# Patient Record
Sex: FEMALE | Race: WHITE | NOT HISPANIC OR LATINO | Employment: OTHER | URBAN - METROPOLITAN AREA
[De-identification: names, ages, dates, MRNs, and addresses within clinical notes are randomized per-mention and may not be internally consistent; named-entity substitution may affect disease eponyms.]

---

## 2017-02-03 ENCOUNTER — HISTORICAL (OUTPATIENT)
Dept: RADIOLOGY | Facility: HOSPITAL | Age: 72
End: 2017-02-03

## 2017-02-10 ENCOUNTER — HISTORICAL (OUTPATIENT)
Dept: RADIOLOGY | Facility: HOSPITAL | Age: 72
End: 2017-02-10

## 2017-02-15 ENCOUNTER — HISTORICAL (OUTPATIENT)
Dept: RADIOLOGY | Facility: HOSPITAL | Age: 72
End: 2017-02-15

## 2017-02-15 ENCOUNTER — HISTORICAL (OUTPATIENT)
Dept: SURGERY | Facility: HOSPITAL | Age: 72
End: 2017-02-15

## 2017-02-16 ENCOUNTER — HISTORICAL (OUTPATIENT)
Dept: ANESTHESIOLOGY | Facility: HOSPITAL | Age: 72
End: 2017-02-16

## 2017-03-06 ENCOUNTER — HISTORICAL (OUTPATIENT)
Dept: RADIOLOGY | Facility: HOSPITAL | Age: 72
End: 2017-03-06

## 2017-04-12 ENCOUNTER — HISTORICAL (OUTPATIENT)
Dept: RADIOLOGY | Facility: HOSPITAL | Age: 72
End: 2017-04-12

## 2017-06-26 ENCOUNTER — HISTORICAL (OUTPATIENT)
Dept: RADIOLOGY | Facility: HOSPITAL | Age: 72
End: 2017-06-26

## 2018-05-01 ENCOUNTER — HISTORICAL (OUTPATIENT)
Dept: RADIOLOGY | Facility: HOSPITAL | Age: 73
End: 2018-05-01

## 2018-05-08 ENCOUNTER — HISTORICAL (OUTPATIENT)
Dept: SURGERY | Facility: HOSPITAL | Age: 73
End: 2018-05-08

## 2018-05-08 LAB
ABS NEUT (OLG): 7 X10(3)/MCL (ref 1.5–6.9)
ALBUMIN SERPL-MCNC: 3.9 GM/DL (ref 3.4–5)
ALBUMIN/GLOB SERPL: 0.9 RATIO
ALP SERPL-CCNC: 96 UNIT/L (ref 30–113)
ALT SERPL-CCNC: 22 UNIT/L (ref 10–45)
APTT PPP: 28.1 SECOND(S) (ref 25–35)
AST SERPL-CCNC: 19 UNIT/L (ref 15–37)
BILIRUB SERPL-MCNC: 0.4 MG/DL (ref 0.1–0.9)
BILIRUBIN DIRECT+TOT PNL SERPL-MCNC: 0.1 MG/DL (ref 0–0.3)
BILIRUBIN DIRECT+TOT PNL SERPL-MCNC: 0.3 MG/DL
BUN SERPL-MCNC: 9 MG/DL (ref 10–20)
CALCIUM SERPL-MCNC: 9.4 MG/DL (ref 8–10.5)
CHLORIDE SERPL-SCNC: 100 MMOL/L (ref 100–108)
CO2 SERPL-SCNC: 28 MMOL/L (ref 21–35)
CREAT SERPL-MCNC: 0.47 MG/DL (ref 0.7–1.3)
ERYTHROCYTE [DISTWIDTH] IN BLOOD BY AUTOMATED COUNT: 13 % (ref 11.5–17)
GLOBULIN SER-MCNC: 4.4 GM/DL
GLUCOSE SERPL-MCNC: 96 MG/DL (ref 75–116)
HCT VFR BLD AUTO: 41.4 % (ref 36–48)
HGB BLD-MCNC: 13.6 GM/DL (ref 12–16)
INR PPP: 1 (ref 0–1.2)
MCH RBC QN AUTO: 29 PG (ref 27–34)
MCHC RBC AUTO-ENTMCNC: 33 GM/DL (ref 31–36)
MCV RBC AUTO: 89 FL (ref 80–99)
PLATELET # BLD AUTO: 272 X10(3)/MCL (ref 140–400)
PMV BLD AUTO: 9.7 FL (ref 6.8–10)
POTASSIUM SERPL-SCNC: 3.3 MMOL/L (ref 3.6–5.2)
PROT SERPL-MCNC: 8.3 GM/DL (ref 6.4–8.2)
PROTHROMBIN TIME: 10 SECOND(S) (ref 9–12)
RBC # BLD AUTO: 4.63 X10(6)/MCL (ref 4.2–5.4)
SODIUM SERPL-SCNC: 139 MMOL/L (ref 135–145)
TSH SERPL-ACNC: 0.67 MIU/ML (ref 0.36–3.74)
WBC # SPEC AUTO: 9.2 X10(3)/MCL (ref 4.5–11.5)

## 2018-05-10 ENCOUNTER — HISTORICAL (OUTPATIENT)
Dept: ANESTHESIOLOGY | Facility: HOSPITAL | Age: 73
End: 2018-05-10

## 2018-07-23 ENCOUNTER — HISTORICAL (OUTPATIENT)
Dept: RADIOLOGY | Facility: HOSPITAL | Age: 73
End: 2018-07-23

## 2018-10-16 ENCOUNTER — HISTORICAL (OUTPATIENT)
Dept: RADIOLOGY | Facility: HOSPITAL | Age: 73
End: 2018-10-16

## 2018-12-06 ENCOUNTER — HISTORICAL (OUTPATIENT)
Dept: ANESTHESIOLOGY | Facility: HOSPITAL | Age: 73
End: 2018-12-06

## 2019-03-01 ENCOUNTER — HISTORICAL (OUTPATIENT)
Dept: RADIOLOGY | Facility: HOSPITAL | Age: 74
End: 2019-03-01

## 2019-04-23 ENCOUNTER — HISTORICAL (OUTPATIENT)
Dept: RADIOLOGY | Facility: HOSPITAL | Age: 74
End: 2019-04-23

## 2019-05-14 ENCOUNTER — HISTORICAL (OUTPATIENT)
Dept: RADIOLOGY | Facility: HOSPITAL | Age: 74
End: 2019-05-14

## 2020-04-28 ENCOUNTER — HISTORICAL (OUTPATIENT)
Dept: ADMINISTRATIVE | Facility: HOSPITAL | Age: 75
End: 2020-04-28

## 2020-04-30 ENCOUNTER — HISTORICAL (OUTPATIENT)
Dept: ADMINISTRATIVE | Facility: HOSPITAL | Age: 75
End: 2020-04-30

## 2020-05-04 ENCOUNTER — HISTORICAL (OUTPATIENT)
Dept: ADMINISTRATIVE | Facility: HOSPITAL | Age: 75
End: 2020-05-04

## 2020-05-05 ENCOUNTER — HISTORICAL (OUTPATIENT)
Dept: ADMINISTRATIVE | Facility: HOSPITAL | Age: 75
End: 2020-05-05

## 2021-01-26 ENCOUNTER — HISTORICAL (OUTPATIENT)
Dept: RADIOLOGY | Facility: HOSPITAL | Age: 76
End: 2021-01-26

## 2021-02-02 ENCOUNTER — HISTORICAL (OUTPATIENT)
Dept: RADIOLOGY | Facility: HOSPITAL | Age: 76
End: 2021-02-02

## 2021-03-29 ENCOUNTER — HOSPITAL ENCOUNTER (OUTPATIENT)
Dept: MEDSURG UNIT | Facility: HOSPITAL | Age: 76
End: 2021-03-30
Attending: OBSTETRICS & GYNECOLOGY | Admitting: OBSTETRICS & GYNECOLOGY

## 2022-04-28 NOTE — OP NOTE
PREOPERATIVE DIAGNOSIS:  Weight loss.    POSTOPERATIVE DIAGNOSIS:  Grade A hiatal hernia, moderate to severe gastritis with petechial ooze.  Status post Helicobacter pylori biopsy of the gastric antrum.    INDICATION:  A 73-year-old white female with a history of approximately 20-pound weight loss over the last 2 years.  She is a smoker.  She has never had any endoscopy before.  We are performing this in order to assess her risk factors for aerodigestive tumors and infections.    DETAILS OF PROCEDURE:  She was consented for the procedure in my office.  The risks and benefits of the procedure were explained to her in detail.  She was willing to undergo those risks.     Please see Anesthesia for their consents as well as their administration of medications.     The patient was brought down to the endoscopy suite, laid in the left lateral decubitus position.  Viscous lidocaine was then swished and swallowed.  A bite block was placed.  The patient was turned to the left lateral decubitus position.  A gastroscope was then lubricated and inserted into the posterior oropharynx.     I took a close look at the posterior oropharynx.  There were no obvious abnormalities.  There were no masses or tumors in the proximal esophagus.  Vocal cords were visualized.  Tracheal and paratracheal regions and laryngeal regions were free of any obvious disease.     The scope was then used to intubate the piriform recess and down the esophagus.  There were no signs of obstruction or masses in the esophageal column.     The scope was then used to insufflate the stomach.  The stomach showed just upon entering some aspects of petechial hemorrhage in certain areas.  She had fair peristalsis in the stomach and she had gastritis that was moderate to severe throughout.     The pylorus was then intubated.  The duodenum and jejunum were within normal limits.  The sphincter of Oddi normal.  No mass lesions, polyps or new mucosal changes.      The scope was pulled back.  The gastric antrum was then biopsied for H pylori, sent off in jar #1.  The greater and lesser curvatures were free of any masses, polyps or any mucosal changes, except gastritis.  Same applied to the angularis.     The scope was turned in retroflexion.  There were no masses, polyps or any signs of overt malignancy to the cardiac and fundic regions.  From an inferior perspective to the GE junction, I was able to see with the scope in retroflexion.  She had a grade A hiatal hernia.     The scope was taken out of retroflexion.  The GE junction was evaluated.  The Z-line was intact and smooth and linear.  It did not show any signs of other gross pathology.     I took a close look at the esophageal column.  There were no masses or polyps.  She had good peristalsis.  No Schatzki rings or obstructions.     The scope was then evaluated for the proximal 2/3 to the same degree with no pathology.     The scope was removed.  Patient tolerated the procedure well.    SUMMARY:  In summary, a 73-year-old white female with grade A hiatal hernia, moderate to severe gastritis, status post Helicobacter pylori gastric antrum biopsy.     Will follow up on these biopsies.  I think her weight loss is due to, at least from the GI perspective, poor diet and long-term gastritis (her colonoscopy was normal, heretofore dictated).     Will follow up on the biopsies and make recommendations thereafter.  For right now, will ensure she is on PPIs and I will instruct her on the proper diet.     Thank you for allowing me to participate in the care of your patient.        LONNIE   DD: 12/06/2018 0754   DT: 12/06/2018 0918  Job # [id number]    cc: Jenaro __________

## 2022-04-28 NOTE — OP NOTE
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    Postoperative diagnosis: Same    Procedure: Excision of benign lesion from the pinna of the left ear (1.5 cm) with simple wound closure (3.5 cm).    Surgeon: Garifeld Eagle M.D.    Findings: Patient had an ulcerative lesion involving the helix and a portion of the antihelix of the pinna of the left ear in its midportion.  The lesion was 1.5 cm in diameter with margins.  Frozen section analysis showed the lesion to be benign.  There was no evidence of malignancy.  Margins of resection were free of disease.  The pathologist indicated that this appeared to be an inflammatory lesion.  Final diagnosis cannot be made on the basis of the frozen section at this time.    Specimens: Skin lesion from the pinna of the left ear.    Estimated blood loss: Less than 5 mL.    Complications: None.    Drains: None.    Anesthesia: Local anesthesia with 2% Xylocaine with 1-100,000 epinephrine with sedation.    Indications: Please see history and physical exam    Procedure: The patient was brought to the operating room and placed on the operating room table in supine position after which she was sedated.  Examination of the pinna of the left ear showed the above-mentioned findings.  The lateral aspect of the pinna around the lesion was infiltrated with 2% Xylocaine with 1 100,000 epinephrine.  The patient was then prepped and draped in sterile fashion.  A circumferential incision was made around the lesion incorporating margin of normal-appearing skin.  The dimensions of the resection were 1.5 x 1.5 cm.  Incision was carried down through the subcutaneous tissue and  then the underlying cartilage was also resected.  The deep margin of the resection was the perichondrium on the posterior aspect of the cartilage.  The lesion was then undermined using blunt and sharp dissection with scissors.  Was then removed from the wound and tagged with a short suture on the superior margin and a long suture on the lateral margin.  Hemostasis was obtained with cautery.  The lesion was then sent for frozen section analysis.  The pathology report indicated that the lesion was not malignant and appeared to be inflammatory.  The margins of resection were free of disease.  Definitive diagnosis could not be made on the frozen section at this time.  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  After examination of the wound was decided to close the wound in a linear fashion.  A triangle of skin along with underlying cartilage was removed inferiorly and the small triangle of skin was also removed superiorly.  This allowed the wound to be converted to a linear wound with a vertical orientation to help re-create a helical rim.  This resulted in the formation of an incision 3.5 cm in length.  The wound was then closed in a single layer using a running 5-0 nylon horizontal mattress suture.  Bacitracin ointment was applied to the wound.  The procedure was then terminated.  The patient was then brought back to her room in stable condition.  She tolerated the procedure well.    CC: Dr. Jenaro Mix in [place]

## 2022-04-28 NOTE — OP NOTE
PREOPERATIVE DIAGNOSIS:  History of colon polyps.    POSTOPERATIVE DIAGNOSIS:  Tortuous colon, left-sided predominant diverticular disease, otherwise normal.    INDICATION:  A 73-year-old white female with a 20-pound weight loss over 2 years.  Her EGD was unrevealing and she had no overt bowel complaints besides occasional constipation.    DETAILS OF PROCEDURE:  She was consented for the procedure in my office prior to.  The risks and benefits of the procedure were explained to her in detail.  She was willing to undergo those risks.     Please see Anesthesia for their documents for consent as well as medication administration.     The patient was brought down to endoscopy room suite.  After the EGD was turned 180 degrees.  Rectal exam was performed.  Poor rectal tone.  No external abnormalities that were noteworthy with regard to the GI tract.  She did have a small lesion that had a keratinaceous horn over her right buttock, over the ischial area and otherwise that can be handled as an outpatient.     The Olympus colonoscope was then lubricated and advanced into the rectum.  It was arduously advanced to the right side of the colon.  She had diverticular disease on the left side that was difficult to get past.  I tried a couple of attempts.  Then I had to turn her in a supine position and I was able to get to the right side of the colon.  I was clearly at that point able to see the cecum.  I saw the appendiceal orifice and photographed.  However, due to her tortuosity could not intubate the terminal ileum.  There were no mass lesions or any polyps or mucosal changes to the right side of the colon, including the cecum.  360-degree circumferential views were then taken.  The hepatic flexure, transverse colon and splenic flexure were within normal limits.  There were a few scant diverticula, 1 to 2 in the transverse.  She had predominantly left-sided diverticular disease in the distal descending and sigmoid.   This was her area of tortuosity.  In this area, I saw no masses or polyps.  The same applied to the distal sigmoid and the rectum.  Of note, she had poor rectal tone and a short rectal column.  I could not therefore retroflex so I did a slow pullback and photographed and did not see any rectal masses or polyps.  The scope was removed.  Patient tolerated the procedure well.    SUMMARY:  In summary, a 73-year-old white female with scant diverticular disease, predominantly left sided and a tortuous colon.    RECOMMENDATIONS:  To repeat colonoscopy in 5 years, assuming not clinically indicated sooner.  With regard to her weight loss, I think overall from a GI perspective, it is likely an upper GI issue.  Would recommend further routine cancer screenings to assess also if the weight loss continues.     Thank you for allowing me to participate in the care of your patient.        LONNIE   DD: 12/06/2018 0758   DT: 12/06/2018 0909  Job # [id number]    cc: Jenaro __________

## 2022-04-28 NOTE — OP NOTE
Patient:   Lejeune, Betty Crochet            MRN: [id number]            FIN: [id number]               Age:   76 years     Sex:  Female     :  1945   Associated Diagnoses:   None   Author:   Hermelinda Turpin MD      Operative Note   Operative Information   Date/ Time:  3/29/2021 08:38:00.     Procedures Performed: Operative laparoscopy with lysis of adhesions ( Dr. Winnie Ortiz)  Left salpingo-oophorectomy (Dr. Hermelinda Turpin ).     Indications: 76-year-old female with postmenopausal adnexal mass and pelvic pain for definitive therapy.     Preoperative Diagnosis: Postmenopausal adnexal mass.     Postoperative Diagnosis: Same.     Surgeon: Hermelinda Turpin MD.     Assistant: Dr. Winnie Ortiz (co-surgeon).     Anesthesia: General endotracheal anesthesia.     Speciman Removed: Left tube and ovary  Frozen section reveals serous cystadenoma; ovarian fibroma; .     Description of Procedure/Findings/    Complications: Patient was taken to the operating room where general endotracheal anesthesia was obtained without difficulty she was prepped and draped in a sterile fashion dorsal lithotomy position and indwelling catheter was placed attention was turned to the patient's abdomen where decision was made to start with incision in the left upper quadrant I varies needle was introduced and sensation to obtain pneumoperitoneum the Veress was removed and another 5 mm trocar and sleeve was introduced trochars removed the camera was placed currently on replacement CO2 significant reconnected after reviewing the abdomen and pelvis with the findings as previously stated decision was made to proceed with port placement with a 5 trocar and sleeve in the left and right hypogastric region respectively under direct visualization at this time Dr. Sarah Penaloza As a Co-Surgeon Freed up the Adnexal Mass from the Left Sidewall Using the Vessel Sealer and the Unipolar Cautery to Mobilize the Adnexal Mass for Removal.   During the Lysis of Adhesions Adhesions There Is Noted to Be a Murky-type Fluid Coming from the Adnexa and Minimal Spillage Was Noted at This Time the Suction  Was Then Placed into the Abdomen and to the Ovarian Cyst Which Was Completely Evacuated of Fluid.  At This Time I Took over and Use the Vessel Sealer to Take down the Infundibulopelvic Ligament to Clamped Cut and Ligated.  The Specimen Was Then Brought out through a 4th Site Was Made in the Superpubic Region with a #1012 Trocar and Sleeve the Back Was Placed and the Adnexal Structures and Brought out through This Port Frozen Pathology Refrozen Findings Were As Previously Stated Benign the Pelvis Was Then Copiously Irrigated and the CO2 Was Allowed to Escape the Abdomen and the Pedicle Was Inspected and Noted to Be Hemostatic the CO2 Is an Reconnected the Fascial Port Incision Was Then Closed with 0 Vicryl by Dr. Titus Alex Using a Grainy Needle.  At This Time the Remainder of the CO2 Was Released from the Abdomen and the Trochars Were Removed and the Fascial Incisions of the Other 3 Ports Were Closed with 0 Vicryl and the Skin Was Closed Septic or Fashion with 4-0 Dexon the Verdugo Catheter Was Removed the Patient Was Awakened and Taken to Recovery in Stable Condition She Will Remain in the Hospital for Overnight Observation since She Is a Smoker and O2 Dependent an Elderly so She'll Be Observed Overnight.  All Surgical Findings Were Discussed with Patient's Family Including the Frozen Section Diagnosis All Surgical Counts Were Correct ×2.     Esimated blood loss: loss less than  100  cc.     Findings: Approximately 14 cm smooth-walled adnexal mass coming off of the left adnexal region; normal-appearing appendix and pelvis; ureteral peristalsis noted bilaterally  pre-and postoperatively normal-appearing smooth liver age and diaphragmatic surface.     Complications: None.

## 2022-05-04 ENCOUNTER — HOSPITAL ENCOUNTER (EMERGENCY)
Facility: HOSPITAL | Age: 77
Discharge: HOME | End: 2022-05-04
Attending: INTERNAL MEDICINE
Payer: MEDICARE

## 2022-05-04 VITALS
HEART RATE: 89 BPM | SYSTOLIC BLOOD PRESSURE: 142 MMHG | RESPIRATION RATE: 18 BRPM | OXYGEN SATURATION: 94 % | WEIGHT: 82 LBS | HEIGHT: 61 IN | DIASTOLIC BLOOD PRESSURE: 73 MMHG | BODY MASS INDEX: 15.48 KG/M2 | TEMPERATURE: 98 F

## 2022-05-04 DIAGNOSIS — R06.02 SHORTNESS OF BREATH: ICD-10-CM

## 2022-05-04 DIAGNOSIS — E87.1 HYPONATREMIA: ICD-10-CM

## 2022-05-04 DIAGNOSIS — R07.9 CHEST PAIN: ICD-10-CM

## 2022-05-04 DIAGNOSIS — R42 DIZZINESS: Primary | ICD-10-CM

## 2022-05-04 LAB
ALBUMIN SERPL-MCNC: 3.9 GM/DL (ref 3.4–4.8)
ALBUMIN/GLOB SERPL: 1.3 RATIO (ref 1.1–2)
ALP SERPL-CCNC: 75 UNIT/L (ref 40–150)
ALT SERPL-CCNC: 12 UNIT/L (ref 0–55)
AST SERPL-CCNC: 17 UNIT/L (ref 5–34)
BASOPHILS # BLD AUTO: 0.04 X10(3)/MCL (ref 0–0.2)
BASOPHILS NFR BLD AUTO: 0.7 %
BILIRUBIN DIRECT+TOT PNL SERPL-MCNC: 0.1 MG/DL (ref 0–0.8)
BILIRUBIN DIRECT+TOT PNL SERPL-MCNC: 0.2 MG/DL (ref 0–0.5)
BILIRUBIN DIRECT+TOT PNL SERPL-MCNC: 0.3 MG/DL
BNP BLD-MCNC: 47.2 PG/ML
BUN SERPL-MCNC: 15 MG/DL (ref 9.8–20.1)
CALCIUM SERPL-MCNC: 9.3 MG/DL (ref 8.4–10.2)
CHLORIDE SERPL-SCNC: 93 MMOL/L (ref 98–107)
CO2 SERPL-SCNC: 26 MMOL/L (ref 23–31)
CREAT SERPL-MCNC: 0.58 MG/DL (ref 0.55–1.02)
EOSINOPHIL # BLD AUTO: 0.04 X10(3)/MCL (ref 0–0.9)
EOSINOPHIL NFR BLD AUTO: 0.7 %
ERYTHROCYTE [DISTWIDTH] IN BLOOD BY AUTOMATED COUNT: 12.2 % (ref 11.5–17)
GLOBULIN SER-MCNC: 2.9 GM/DL (ref 2.4–3.5)
GLUCOSE SERPL-MCNC: 99 MG/DL (ref 82–115)
HCT VFR BLD AUTO: 32.4 % (ref 37–47)
HGB BLD-MCNC: 10.9 GM/DL (ref 12–16)
IMM GRANULOCYTES # BLD AUTO: 0.02 X10(3)/MCL (ref 0–0.02)
IMM GRANULOCYTES NFR BLD AUTO: 0.3 % (ref 0–0.43)
LYMPHOCYTES # BLD AUTO: 1.12 X10(3)/MCL (ref 0.6–4.6)
LYMPHOCYTES NFR BLD AUTO: 18.7 %
MCH RBC QN AUTO: 29.4 PG (ref 27–31)
MCHC RBC AUTO-ENTMCNC: 33.6 MG/DL (ref 33–36)
MCV RBC AUTO: 87.3 FL (ref 80–94)
MONOCYTES # BLD AUTO: 0.54 X10(3)/MCL (ref 0.1–1.3)
MONOCYTES NFR BLD AUTO: 9 %
NEUTROPHILS # BLD AUTO: 4.2 X10(3)/MCL (ref 2.1–9.2)
NEUTROPHILS NFR BLD AUTO: 70.6 %
PLATELET # BLD AUTO: 274 X10(3)/MCL (ref 130–400)
PMV BLD AUTO: 9 FL (ref 9.4–12.4)
POTASSIUM SERPL-SCNC: 3.7 MMOL/L (ref 3.5–5.1)
PROT SERPL-MCNC: 6.8 GM/DL (ref 5.8–7.6)
RBC # BLD AUTO: 3.71 X10(6)/MCL (ref 4.2–5.4)
SODIUM SERPL-SCNC: 127 MMOL/L (ref 136–145)
TROPONIN I SERPL-MCNC: <0.01 NG/ML (ref 0–0.04)
WBC # SPEC AUTO: 6 X10(3)/MCL (ref 4.5–11.5)

## 2022-05-04 PROCEDURE — 36415 COLL VENOUS BLD VENIPUNCTURE: CPT | Performed by: INTERNAL MEDICINE

## 2022-05-04 PROCEDURE — 96361 HYDRATE IV INFUSION ADD-ON: CPT

## 2022-05-04 PROCEDURE — 96360 HYDRATION IV INFUSION INIT: CPT

## 2022-05-04 PROCEDURE — 84484 ASSAY OF TROPONIN QUANT: CPT | Performed by: INTERNAL MEDICINE

## 2022-05-04 PROCEDURE — 25000003 PHARM REV CODE 250: Performed by: INTERNAL MEDICINE

## 2022-05-04 PROCEDURE — 85025 COMPLETE CBC W/AUTO DIFF WBC: CPT | Performed by: INTERNAL MEDICINE

## 2022-05-04 PROCEDURE — 99285 EMERGENCY DEPT VISIT HI MDM: CPT | Mod: 25

## 2022-05-04 PROCEDURE — 93005 ELECTROCARDIOGRAM TRACING: CPT

## 2022-05-04 PROCEDURE — 83880 ASSAY OF NATRIURETIC PEPTIDE: CPT | Performed by: INTERNAL MEDICINE

## 2022-05-04 PROCEDURE — 80053 COMPREHEN METABOLIC PANEL: CPT | Performed by: INTERNAL MEDICINE

## 2022-05-04 RX ORDER — SODIUM CHLORIDE 9 MG/ML
1000 INJECTION, SOLUTION INTRAVENOUS
Status: COMPLETED | OUTPATIENT
Start: 2022-05-04 | End: 2022-05-04

## 2022-05-04 RX ADMIN — SODIUM CHLORIDE 1000 ML: 9 INJECTION, SOLUTION INTRAVENOUS at 04:05
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
## 2022-05-04 NOTE — ED NOTES
"Pt to ED via EMS for dizziness. Pt states felt dizzy this am, dizziness has improved. Pt reports "my feet feel heavy". Pt has bilateral contracture of hands. Pt in no distress, all safety and personal needs addressed.   "

## 2022-07-07 ENCOUNTER — LAB VISIT (OUTPATIENT)
Dept: LAB | Facility: HOSPITAL | Age: 77
End: 2022-07-07
Attending: FAMILY MEDICINE
Payer: MEDICARE

## 2022-07-07 DIAGNOSIS — R53.83 FATIGUE, UNSPECIFIED TYPE: Primary | ICD-10-CM

## 2022-07-07 LAB
ALBUMIN SERPL-MCNC: 4.2 GM/DL (ref 3.4–4.8)
ALBUMIN/GLOB SERPL: 1.4 RATIO (ref 1.1–2)
ALP SERPL-CCNC: 98 UNIT/L (ref 40–150)
ALT SERPL-CCNC: 14 UNIT/L (ref 0–55)
APPEARANCE UR: CLEAR
AST SERPL-CCNC: 17 UNIT/L (ref 5–34)
BACTERIA #/AREA URNS AUTO: ABNORMAL /HPF
BASOPHILS # BLD AUTO: 0.05 X10(3)/MCL (ref 0–0.2)
BASOPHILS NFR BLD AUTO: 0.9 %
BILIRUB UR QL STRIP.AUTO: NEGATIVE MG/DL
BILIRUBIN DIRECT+TOT PNL SERPL-MCNC: 0.5 MG/DL
BUN SERPL-MCNC: 11 MG/DL (ref 9.8–20.1)
CALCIUM SERPL-MCNC: 9.1 MG/DL (ref 8.4–10.2)
CHLORIDE SERPL-SCNC: 95 MMOL/L (ref 98–107)
CO2 SERPL-SCNC: 28 MMOL/L (ref 23–31)
COLOR UR AUTO: YELLOW
CREAT SERPL-MCNC: 0.54 MG/DL (ref 0.55–1.02)
EOSINOPHIL # BLD AUTO: 0.05 X10(3)/MCL (ref 0–0.9)
EOSINOPHIL NFR BLD AUTO: 0.9 %
ERYTHROCYTE [DISTWIDTH] IN BLOOD BY AUTOMATED COUNT: 12.8 % (ref 11.5–17)
FLUAV AG UPPER RESP QL IA.RAPID: NOT DETECTED
FLUBV AG UPPER RESP QL IA.RAPID: NOT DETECTED
GLOBULIN SER-MCNC: 3 GM/DL (ref 2.4–3.5)
GLUCOSE SERPL-MCNC: 99 MG/DL (ref 82–115)
GLUCOSE UR QL STRIP.AUTO: NEGATIVE MG/DL
HCT VFR BLD AUTO: 37.3 % (ref 37–47)
HGB BLD-MCNC: 11.7 GM/DL (ref 12–16)
IMM GRANULOCYTES # BLD AUTO: 0.01 X10(3)/MCL (ref 0–0.04)
IMM GRANULOCYTES NFR BLD AUTO: 0.2 %
KETONES UR QL STRIP.AUTO: ABNORMAL MG/DL
LEUKOCYTE ESTERASE UR QL STRIP.AUTO: ABNORMAL UNIT/L
LYMPHOCYTES # BLD AUTO: 1.04 X10(3)/MCL (ref 0.6–4.6)
LYMPHOCYTES NFR BLD AUTO: 19.7 %
MCH RBC QN AUTO: 28.3 PG (ref 27–31)
MCHC RBC AUTO-ENTMCNC: 31.4 MG/DL (ref 33–36)
MCV RBC AUTO: 90.1 FL (ref 80–94)
MONOCYTES # BLD AUTO: 0.44 X10(3)/MCL (ref 0.1–1.3)
MONOCYTES NFR BLD AUTO: 8.3 %
NEUTROPHILS # BLD AUTO: 3.7 X10(3)/MCL (ref 2.1–9.2)
NEUTROPHILS NFR BLD AUTO: 70 %
NITRITE UR QL STRIP.AUTO: POSITIVE
PH UR STRIP.AUTO: 6 [PH]
PLATELET # BLD AUTO: 293 X10(3)/MCL (ref 130–400)
PMV BLD AUTO: 9.3 FL (ref 7.4–10.4)
POTASSIUM SERPL-SCNC: 3.6 MMOL/L (ref 3.5–5.1)
PROT SERPL-MCNC: 7.2 GM/DL (ref 5.8–7.6)
PROT UR QL STRIP.AUTO: NEGATIVE MG/DL
RBC # BLD AUTO: 4.14 X10(6)/MCL (ref 4.2–5.4)
RBC #/AREA URNS AUTO: ABNORMAL /HPF
RBC UR QL AUTO: ABNORMAL UNIT/L
RSV A 5' UTR RNA NPH QL NAA+PROBE: NOT DETECTED
SARS-COV-2 RNA RESP QL NAA+PROBE: NOT DETECTED
SODIUM SERPL-SCNC: 130 MMOL/L (ref 136–145)
SP GR UR STRIP.AUTO: 1.02
SQUAMOUS #/AREA URNS AUTO: ABNORMAL /HPF
UROBILINOGEN UR STRIP-ACNC: 2 MG/DL
WBC # SPEC AUTO: 5.3 X10(3)/MCL (ref 4.5–11.5)
WBC #/AREA URNS AUTO: ABNORMAL /HPF

## 2022-07-07 PROCEDURE — 85025 COMPLETE CBC W/AUTO DIFF WBC: CPT

## 2022-07-07 PROCEDURE — 87077 CULTURE AEROBIC IDENTIFY: CPT

## 2022-07-07 PROCEDURE — 80053 COMPREHEN METABOLIC PANEL: CPT

## 2022-07-07 PROCEDURE — 36415 COLL VENOUS BLD VENIPUNCTURE: CPT

## 2022-07-07 PROCEDURE — 81001 URINALYSIS AUTO W/SCOPE: CPT

## 2022-07-07 PROCEDURE — 87636 SARSCOV2 & INF A&B AMP PRB: CPT | Mod: CR

## 2022-07-10 LAB — BACTERIA UR CULT: ABNORMAL

## 2022-09-15 ENCOUNTER — HOSPITAL ENCOUNTER (EMERGENCY)
Facility: HOSPITAL | Age: 77
Discharge: HOME | End: 2022-09-15
Attending: FAMILY MEDICINE
Payer: MEDICARE

## 2022-09-15 VITALS
RESPIRATION RATE: 18 BRPM | WEIGHT: 83 LBS | DIASTOLIC BLOOD PRESSURE: 78 MMHG | SYSTOLIC BLOOD PRESSURE: 118 MMHG | HEART RATE: 81 BPM | BODY MASS INDEX: 15.67 KG/M2 | HEIGHT: 61 IN | TEMPERATURE: 98 F | OXYGEN SATURATION: 98 %

## 2022-09-15 DIAGNOSIS — R06.02 SOB (SHORTNESS OF BREATH): ICD-10-CM

## 2022-09-15 DIAGNOSIS — U07.1 COVID-19: Primary | ICD-10-CM

## 2022-09-15 DIAGNOSIS — J44.9 CHRONIC OBSTRUCTIVE PULMONARY DISEASE, UNSPECIFIED COPD TYPE: ICD-10-CM

## 2022-09-15 LAB
ALBUMIN SERPL-MCNC: 3.3 GM/DL (ref 3.4–4.8)
ALBUMIN/GLOB SERPL: 1 RATIO (ref 1.1–2)
ALP SERPL-CCNC: 82 UNIT/L (ref 40–150)
ALT SERPL-CCNC: 14 UNIT/L (ref 0–55)
AST SERPL-CCNC: 17 UNIT/L (ref 5–34)
BASOPHILS # BLD AUTO: 0.02 X10(3)/MCL (ref 0–0.2)
BASOPHILS NFR BLD AUTO: 0.2 %
BILIRUBIN DIRECT+TOT PNL SERPL-MCNC: 0.5 MG/DL
BNP BLD-MCNC: 163.6 PG/ML
BUN SERPL-MCNC: 10 MG/DL (ref 9.8–20.1)
CALCIUM SERPL-MCNC: 9.2 MG/DL (ref 8.4–10.2)
CHLORIDE SERPL-SCNC: 94 MMOL/L (ref 98–107)
CO2 SERPL-SCNC: 32 MMOL/L (ref 23–31)
CREAT SERPL-MCNC: 0.49 MG/DL (ref 0.55–1.02)
EOSINOPHIL # BLD AUTO: 0 X10(3)/MCL (ref 0–0.9)
EOSINOPHIL NFR BLD AUTO: 0 %
ERYTHROCYTE [DISTWIDTH] IN BLOOD BY AUTOMATED COUNT: 14.2 % (ref 11.5–17)
FLUAV AG UPPER RESP QL IA.RAPID: NOT DETECTED
FLUBV AG UPPER RESP QL IA.RAPID: NOT DETECTED
GFR SERPLBLD CREATININE-BSD FMLA CKD-EPI: >60 MLS/MIN/1.73/M2
GLOBULIN SER-MCNC: 3.4 GM/DL (ref 2.4–3.5)
GLUCOSE SERPL-MCNC: 112 MG/DL (ref 82–115)
HCT VFR BLD AUTO: 34.7 % (ref 37–47)
HGB BLD-MCNC: 10.9 GM/DL (ref 12–16)
IMM GRANULOCYTES # BLD AUTO: 0.03 X10(3)/MCL (ref 0–0.04)
IMM GRANULOCYTES NFR BLD AUTO: 0.2 %
LACTATE SERPL-SCNC: 1.2 MMOL/L (ref 0.5–2.2)
LYMPHOCYTES # BLD AUTO: 0.76 X10(3)/MCL (ref 0.6–4.6)
LYMPHOCYTES NFR BLD AUTO: 5.9 %
MAGNESIUM SERPL-MCNC: 1.7 MG/DL (ref 1.6–2.6)
MCH RBC QN AUTO: 27.6 PG (ref 27–31)
MCHC RBC AUTO-ENTMCNC: 31.4 MG/DL (ref 33–36)
MCV RBC AUTO: 87.8 FL (ref 80–94)
MONOCYTES # BLD AUTO: 0.68 X10(3)/MCL (ref 0.1–1.3)
MONOCYTES NFR BLD AUTO: 5.3 %
NEUTROPHILS # BLD AUTO: 11.3 X10(3)/MCL (ref 2.1–9.2)
NEUTROPHILS NFR BLD AUTO: 88.4 %
PLATELET # BLD AUTO: 226 X10(3)/MCL (ref 130–400)
PMV BLD AUTO: 9.1 FL (ref 7.4–10.4)
POTASSIUM SERPL-SCNC: 3 MMOL/L (ref 3.5–5.1)
PROT SERPL-MCNC: 6.7 GM/DL (ref 5.8–7.6)
RBC # BLD AUTO: 3.95 X10(6)/MCL (ref 4.2–5.4)
SARS-COV-2 RNA RESP QL NAA+PROBE: DETECTED
SODIUM SERPL-SCNC: 135 MMOL/L (ref 136–145)
TROPONIN I SERPL-MCNC: 0.03 NG/ML (ref 0–0.04)
WBC # SPEC AUTO: 12.8 X10(3)/MCL (ref 4.5–11.5)

## 2022-09-15 PROCEDURE — 85025 COMPLETE CBC W/AUTO DIFF WBC: CPT | Performed by: FAMILY MEDICINE

## 2022-09-15 PROCEDURE — 83880 ASSAY OF NATRIURETIC PEPTIDE: CPT | Performed by: FAMILY MEDICINE

## 2022-09-15 PROCEDURE — 96374 THER/PROPH/DIAG INJ IV PUSH: CPT

## 2022-09-15 PROCEDURE — 83735 ASSAY OF MAGNESIUM: CPT | Performed by: FAMILY MEDICINE

## 2022-09-15 PROCEDURE — 25000003 PHARM REV CODE 250: Performed by: FAMILY MEDICINE

## 2022-09-15 PROCEDURE — 80053 COMPREHEN METABOLIC PANEL: CPT | Performed by: FAMILY MEDICINE

## 2022-09-15 PROCEDURE — 36415 COLL VENOUS BLD VENIPUNCTURE: CPT | Performed by: FAMILY MEDICINE

## 2022-09-15 PROCEDURE — 83605 ASSAY OF LACTIC ACID: CPT | Performed by: FAMILY MEDICINE

## 2022-09-15 PROCEDURE — 63600175 PHARM REV CODE 636 W HCPCS: Performed by: FAMILY MEDICINE

## 2022-09-15 PROCEDURE — 87636 SARSCOV2 & INF A&B AMP PRB: CPT | Performed by: FAMILY MEDICINE

## 2022-09-15 PROCEDURE — 84484 ASSAY OF TROPONIN QUANT: CPT | Performed by: FAMILY MEDICINE

## 2022-09-15 PROCEDURE — 99285 EMERGENCY DEPT VISIT HI MDM: CPT | Mod: 25

## 2022-09-15 RX ORDER — DEXAMETHASONE SODIUM PHOSPHATE 4 MG/ML
8 INJECTION, SOLUTION INTRA-ARTICULAR; INTRALESIONAL; INTRAMUSCULAR; INTRAVENOUS; SOFT TISSUE
Status: COMPLETED | OUTPATIENT
Start: 2022-09-15 | End: 2022-09-15

## 2022-09-15 RX ORDER — AZITHROMYCIN 250 MG/1
250 TABLET, FILM COATED ORAL DAILY
Qty: 6 TABLET | Refills: 0 | Status: SHIPPED | OUTPATIENT
Start: 2022-09-15

## 2022-09-15 RX ORDER — DEXAMETHASONE 6 MG/1
6 TABLET ORAL DAILY
Qty: 7 TABLET | Refills: 0 | Status: SHIPPED | OUTPATIENT
Start: 2022-09-15 | End: 2022-09-22

## 2022-09-15 RX ADMIN — POTASSIUM BICARBONATE 40 MEQ: 782 TABLET, EFFERVESCENT ORAL at 03:09

## 2022-09-15 RX ADMIN — DEXAMETHASONE SODIUM PHOSPHATE 8 MG: 4 INJECTION, SOLUTION INTRA-ARTICULAR; INTRALESIONAL; INTRAMUSCULAR; INTRAVENOUS; SOFT TISSUE at 02:09

## 2022-09-15 NOTE — ED PROVIDER NOTES
Encounter Date: 9/15/2022       History     Chief Complaint   Patient presents with    Shortness of Breath     Covid + 4 days ago. C/o SOB, cough, fever and low oxygen level     77-year-old female with history of hypertension and muscular dystrophy who recently had a home positive COVID test presents with shortness of breath, cough, and hypoxia.  Patient is on supplemental oxygen at home.  States that when she increases her oxygen level her sats improve and she feels better.  Denies chest pain.  No other complaints.    Review of patient's allergies indicates:   Allergen Reactions    Aspirin      Other reaction(s): Unable to tolerate  325 mg dose    Codeine      Other reaction(s): Irregular Heart Rate, unknown    Cortisone      Other reaction(s): unknown    Ezetimibe-simvastatin     Pravastatin      Other reaction(s): unknown     Past Medical History:   Diagnosis Date    Anxiety disorder, unspecified     CMTS (Charcot-Toya-Tooth syndrome)     Hypertension     Muscular dystrophy     Smoker      Past Surgical History:   Procedure Laterality Date    CHOLECYSTECTOMY      HIP REPLACEMENT ARTHROPLASTY      HYSTERECTOMY       Family History   Problem Relation Age of Onset    Hypertension Mother     Coronary artery disease Mother      Social History     Tobacco Use    Smoking status: Heavy Smoker     Packs/day: 0.50     Types: Cigarettes    Smokeless tobacco: Never   Substance Use Topics    Alcohol use: Not Currently    Drug use: Never     Review of Systems   Constitutional: Negative.    HENT: Negative.     Eyes: Negative.    Respiratory:  Positive for cough and shortness of breath.    Cardiovascular: Negative.    Gastrointestinal: Negative.    Endocrine: Negative.    Genitourinary: Negative.    Musculoskeletal: Negative.    Skin: Negative.    Allergic/Immunologic: Negative.    Neurological: Negative.    Hematological: Negative.    Psychiatric/Behavioral: Negative.       Physical Exam     Initial Vitals [09/15/22 1328]   BP  Pulse Resp Temp SpO2   113/68 106 20 98.6 °F (37 °C) (!) 85 %      MAP       --         Physical Exam    Nursing note and vitals reviewed.  Constitutional: She appears well-developed and well-nourished.   HENT:   Head: Normocephalic and atraumatic.   Eyes: Conjunctivae and EOM are normal. Pupils are equal, round, and reactive to light.   Neck: Neck supple.   Normal range of motion.  Cardiovascular:  Normal rate and regular rhythm.           Pulmonary/Chest: No respiratory distress. She has wheezes.   Abdominal: Abdomen is soft. Bowel sounds are normal.   Musculoskeletal:         General: Normal range of motion.      Cervical back: Normal range of motion and neck supple.     Neurological: She is alert and oriented to person, place, and time.   Skin: Skin is warm. Capillary refill takes less than 2 seconds.   Psychiatric: She has a normal mood and affect.       ED Course   Procedures  Labs Reviewed   B-TYPE NATRIURETIC PEPTIDE - Abnormal; Notable for the following components:       Result Value    Natriuretic Peptide 163.6 (*)     All other components within normal limits   COMPREHENSIVE METABOLIC PANEL - Abnormal; Notable for the following components:    Sodium Level 135 (*)     Potassium Level 3.0 (*)     Chloride 94 (*)     Carbon Dioxide 32 (*)     Creatinine 0.49 (*)     Albumin Level 3.3 (*)     Albumin/Globulin Ratio 1.0 (*)     All other components within normal limits   CBC WITH DIFFERENTIAL - Abnormal; Notable for the following components:    WBC 12.8 (*)     RBC 3.95 (*)     Hgb 10.9 (*)     Hct 34.7 (*)     MCHC 31.4 (*)     Neut # 11.3 (*)     All other components within normal limits   COVID/FLU A&B PCR - Abnormal; Notable for the following components:    SARS-CoV-2 PCR Detected (*)     All other components within normal limits   LACTIC ACID, PLASMA - Normal   MAGNESIUM - Normal   TROPONIN I - Normal   CBC W/ AUTO DIFFERENTIAL    Narrative:     The following orders were created for panel order CBC auto  differential.  Procedure                               Abnormality         Status                     ---------                               -----------         ------                     CBC with Differential[[id number]]        Abnormal            Final result                 Please view results for these tests on the individual orders.     EKG Readings: (Independently Interpreted)   Rhythm: Normal Sinus Rhythm. Heart Rate: 93. Ectopy: No Ectopy. Conduction: RBBB. ST Segments: Normal ST Segments. T Waves: Normal. Clinical Impression: Normal Sinus Rhythm with RBBB   ECG Results              EKG 12-LEAD (Final result)  Result time 09/21/22 09:25:34      Final result by Unknown User (09/21/22 09:25:34)                                      Imaging Results              X-Ray Chest 1 View (Final result)  Result time 09/15/22 14:53:35      Final result by David Nova MD (09/15/22 14:53:35)                   Impression:      1. COPD changes  2. Atelectasis and/or scarring left lower lung  3. Marked dextro thoracolumbar scoliosis  4. Osteopenia      Electronically signed by: David Nova  Date:    09/15/2022  Time:    14:53               Narrative:    EXAMINATION:  XR CHEST 1 VIEW    CLINICAL HISTORY:  sob;, .    COMPARISON:  None available    FINDINGS:  An AP view or more reveals the heart to be normal in size.  The trachea is to the right of midline.  There is marked curvature of the thoracolumbar spine with the convexity to the right.  Atelectasis and/or scarring is evident at the left the lower lung.  No consolidative infiltrate or effusion is seen.  Bony structures are osteopenic.                                       Medications   dexamethasone injection 8 mg (8 mg Intravenous Given 9/15/22 1429)   potassium bicarbonate disintegrating tablet 40 mEq (40 mEq Oral Given 9/15/22 1500)     Medical Decision Making:   ED Management:  Patient is nontoxic-appearing in no acute distress.  Satting well on supplemental  oxygen.  Afebrile.  Not tachycardic.  Workup positive for COVID.  Chest x-ray negative for infiltrates.  Patient feels better after steroids and requesting discharge home.  Daughter in agreement.  Encouraged her to call follow-up with her PCP as soon as possible for further evaluation.  Strict return to ER precautions given, patient voiced understanding.           ED Course as of 22 0448   Thu Sep 15, 2022   1513 Dr. De La Torre- Ok to discharge. Start Decadron 6 mg for 1 week and Z basia.  [AG]      ED Course User Index  [AG] Cheo Beach MD                   Clinical Impression:   Final diagnoses:  [R06.02] SOB (shortness of breath)  [U07.1] COVID-19 (Primary)  [J44.9] Chronic obstructive pulmonary disease, unspecified COPD type        ED Disposition Condition    Discharge Stable          ED Prescriptions       Medication Sig Dispense Start Date End Date Auth. Provider    dexAMETHasone (DECADRON) 6 MG tablet () Take 1 tablet (6 mg total) by mouth Daily. for 7 days 7 tablet 9/15/2022 2022 Cheo Beach MD    azithromycin (Z-BASIA) 250 MG tablet Take 1 tablet (250 mg total) by mouth once daily. Take first 2 tablets together, then 1 every day until finished. 6 tablet 9/15/2022 -- Cheo Beach MD          Follow-up Information       Follow up With Specialties Details Why Contact Info    Panchito De La Torre III, MD Internal Medicine   [address]  [address]  [place] [address]  [phone]               Cheo Beach MD  22 0459

## 2023-03-12 ENCOUNTER — HOSPITAL ENCOUNTER (EMERGENCY)
Facility: HOSPITAL | Age: 78
Discharge: HOME | End: 2023-03-12
Attending: EMERGENCY MEDICINE
Payer: MEDICARE

## 2023-03-12 VITALS
SYSTOLIC BLOOD PRESSURE: 165 MMHG | OXYGEN SATURATION: 98 % | DIASTOLIC BLOOD PRESSURE: 61 MMHG | HEART RATE: 100 BPM | WEIGHT: 82 LBS | TEMPERATURE: 98 F | BODY MASS INDEX: 16.1 KG/M2 | RESPIRATION RATE: 20 BRPM | HEIGHT: 60 IN

## 2023-03-12 DIAGNOSIS — R06.02 SHORTNESS OF BREATH: ICD-10-CM

## 2023-03-12 DIAGNOSIS — J44.1 COPD EXACERBATION: Primary | ICD-10-CM

## 2023-03-12 LAB
ALBUMIN SERPL-MCNC: 3.9 G/DL (ref 3.4–4.8)
ALBUMIN/GLOB SERPL: 1.2 RATIO (ref 1.1–2)
ALP SERPL-CCNC: 95 UNIT/L (ref 40–150)
ALT SERPL-CCNC: 20 UNIT/L (ref 0–55)
APTT PPP: 30 SECONDS (ref 23.2–33.7)
AST SERPL-CCNC: 31 UNIT/L (ref 5–34)
BASOPHILS # BLD AUTO: 0.02 X10(3)/MCL (ref 0–0.2)
BASOPHILS NFR BLD AUTO: 0.3 %
BILIRUBIN DIRECT+TOT PNL SERPL-MCNC: 0.4 MG/DL
BNP BLD-MCNC: 76.8 PG/ML
BUN SERPL-MCNC: 7 MG/DL (ref 9.8–20.1)
CALCIUM SERPL-MCNC: 9.1 MG/DL (ref 8.4–10.2)
CHLORIDE SERPL-SCNC: 93 MMOL/L (ref 98–107)
CO2 SERPL-SCNC: 30 MMOL/L (ref 23–31)
CREAT SERPL-MCNC: 0.43 MG/DL (ref 0.55–1.02)
EOSINOPHIL # BLD AUTO: 0.02 X10(3)/MCL (ref 0–0.9)
EOSINOPHIL NFR BLD AUTO: 0.3 %
ERYTHROCYTE [DISTWIDTH] IN BLOOD BY AUTOMATED COUNT: 12.9 % (ref 11.5–17)
GFR SERPLBLD CREATININE-BSD FMLA CKD-EPI: >60 MLS/MIN/1.73/M2
GLOBULIN SER-MCNC: 3.3 GM/DL (ref 2.4–3.5)
GLUCOSE SERPL-MCNC: 111 MG/DL (ref 82–115)
HCT VFR BLD AUTO: 37.2 % (ref 37–47)
HGB BLD-MCNC: 11.8 G/DL (ref 12–16)
IMM GRANULOCYTES # BLD AUTO: 0.02 X10(3)/MCL (ref 0–0.04)
IMM GRANULOCYTES NFR BLD AUTO: 0.3 %
INR BLD: 0.95 (ref 0–1.3)
LYMPHOCYTES # BLD AUTO: 0.43 X10(3)/MCL (ref 0.6–4.6)
LYMPHOCYTES NFR BLD AUTO: 7.2 %
MCH RBC QN AUTO: 28.7 PG
MCHC RBC AUTO-ENTMCNC: 31.7 G/DL (ref 33–36)
MCV RBC AUTO: 90.5 FL (ref 80–94)
MONOCYTES # BLD AUTO: 0.33 X10(3)/MCL (ref 0.1–1.3)
MONOCYTES NFR BLD AUTO: 5.5 %
NEUTROPHILS # BLD AUTO: 5.16 X10(3)/MCL (ref 2.1–9.2)
NEUTROPHILS NFR BLD AUTO: 86.4 %
PLATELET # BLD AUTO: 208 X10(3)/MCL (ref 130–400)
PMV BLD AUTO: 9.6 FL (ref 7.4–10.4)
POTASSIUM SERPL-SCNC: 3.7 MMOL/L (ref 3.5–5.1)
PROT SERPL-MCNC: 7.2 GM/DL (ref 5.8–7.6)
PROTHROMBIN TIME: 13 SECONDS (ref 12.5–14.5)
RBC # BLD AUTO: 4.11 X10(6)/MCL (ref 4.2–5.4)
SODIUM SERPL-SCNC: 134 MMOL/L (ref 136–145)
TROPONIN I SERPL-MCNC: 0.03 NG/ML (ref 0–0.04)
WBC # SPEC AUTO: 6 X10(3)/MCL (ref 4.5–11.5)

## 2023-03-12 PROCEDURE — 94640 AIRWAY INHALATION TREATMENT: CPT

## 2023-03-12 PROCEDURE — 25000242 PHARM REV CODE 250 ALT 637 W/ HCPCS: Performed by: EMERGENCY MEDICINE

## 2023-03-12 PROCEDURE — 93010 ELECTROCARDIOGRAM REPORT: CPT | Mod: ,,, | Performed by: INTERNAL MEDICINE

## 2023-03-12 PROCEDURE — 99285 EMERGENCY DEPT VISIT HI MDM: CPT | Mod: 25

## 2023-03-12 PROCEDURE — 93010 EKG 12-LEAD: ICD-10-PCS | Mod: ,,, | Performed by: INTERNAL MEDICINE

## 2023-03-12 PROCEDURE — 80053 COMPREHEN METABOLIC PANEL: CPT | Performed by: EMERGENCY MEDICINE

## 2023-03-12 PROCEDURE — 25000003 PHARM REV CODE 250: Performed by: EMERGENCY MEDICINE

## 2023-03-12 PROCEDURE — 94761 N-INVAS EAR/PLS OXIMETRY MLT: CPT

## 2023-03-12 PROCEDURE — 96360 HYDRATION IV INFUSION INIT: CPT

## 2023-03-12 PROCEDURE — 93005 ELECTROCARDIOGRAM TRACING: CPT

## 2023-03-12 PROCEDURE — 84484 ASSAY OF TROPONIN QUANT: CPT | Performed by: EMERGENCY MEDICINE

## 2023-03-12 PROCEDURE — 85730 THROMBOPLASTIN TIME PARTIAL: CPT | Performed by: EMERGENCY MEDICINE

## 2023-03-12 PROCEDURE — 85610 PROTHROMBIN TIME: CPT | Performed by: EMERGENCY MEDICINE

## 2023-03-12 PROCEDURE — 85025 COMPLETE CBC W/AUTO DIFF WBC: CPT | Performed by: EMERGENCY MEDICINE

## 2023-03-12 PROCEDURE — 83880 ASSAY OF NATRIURETIC PEPTIDE: CPT | Performed by: EMERGENCY MEDICINE

## 2023-03-12 PROCEDURE — 63600175 PHARM REV CODE 636 W HCPCS: Performed by: EMERGENCY MEDICINE

## 2023-03-12 RX ORDER — PREDNISONE 20 MG/1
60 TABLET ORAL
Status: COMPLETED | OUTPATIENT
Start: 2023-03-12 | End: 2023-03-12

## 2023-03-12 RX ORDER — AMOXICILLIN AND CLAVULANATE POTASSIUM 875; 125 MG/1; MG/1
1 TABLET, FILM COATED ORAL
Status: COMPLETED | OUTPATIENT
Start: 2023-03-12 | End: 2023-03-12

## 2023-03-12 RX ORDER — IPRATROPIUM BROMIDE AND ALBUTEROL SULFATE 2.5; .5 MG/3ML; MG/3ML
3 SOLUTION RESPIRATORY (INHALATION)
Status: COMPLETED | OUTPATIENT
Start: 2023-03-12 | End: 2023-03-12

## 2023-03-12 RX ORDER — SODIUM CHLORIDE 9 MG/ML
500 INJECTION, SOLUTION INTRAVENOUS
Status: COMPLETED | OUTPATIENT
Start: 2023-03-12 | End: 2023-03-12

## 2023-03-12 RX ORDER — IPRATROPIUM BROMIDE AND ALBUTEROL SULFATE 2.5; .5 MG/3ML; MG/3ML
3 SOLUTION RESPIRATORY (INHALATION) EVERY 6 HOURS PRN
Qty: 75 ML | Refills: 0 | Status: SHIPPED | OUTPATIENT
Start: 2023-03-12 | End: 2023-04-11

## 2023-03-12 RX ORDER — PREDNISONE 20 MG/1
60 TABLET ORAL DAILY
Qty: 15 TABLET | Refills: 0 | Status: SHIPPED | OUTPATIENT
Start: 2023-03-12 | End: 2023-03-17

## 2023-03-12 RX ORDER — AMOXICILLIN AND CLAVULANATE POTASSIUM 875; 125 MG/1; MG/1
1 TABLET, FILM COATED ORAL 2 TIMES DAILY
Qty: 14 TABLET | Refills: 0 | Status: SHIPPED | OUTPATIENT
Start: 2023-03-12

## 2023-03-12 RX ADMIN — IPRATROPIUM BROMIDE AND ALBUTEROL SULFATE 3 ML: .5; 3 SOLUTION RESPIRATORY (INHALATION) at 06:03

## 2023-03-12 RX ADMIN — SODIUM CHLORIDE 500 ML: 9 INJECTION, SOLUTION INTRAVENOUS at 06:03

## 2023-03-12 RX ADMIN — PREDNISONE 60 MG: 20 TABLET ORAL at 06:03

## 2023-03-12 RX ADMIN — AMOXICILLIN AND CLAVULANATE POTASSIUM 1 TABLET: 875; 125 TABLET, FILM COATED ORAL at 08:03

## 2023-03-12 NOTE — ED PROVIDER NOTES
Encounter Date: 3/12/2023       History     Chief Complaint   Patient presents with    Shortness of Breath     SOB for last 2 days   chronic home o2 @3 pt     Mrs. Betty Lejeune is a 77 yo female who presents with chief complaint shortness of breath. Onset was 2 days ago when she began having shortness of breath that has progressively worsened associated with productive cough greenish sputum. Symptoms aggravated with activity and improved with rest and use of home oxygen. States that she normally only uses oxygen at night but for the last 2 days she has had to use it continuously.    The history is provided by the patient.   Review of patient's allergies indicates:   Allergen Reactions    Aspirin      Other reaction(s): Unable to tolerate  325 mg dose    Codeine      Other reaction(s): Irregular Heart Rate, unknown    Cortisone      Other reaction(s): unknown    Ezetimibe-simvastatin     Pravastatin      Other reaction(s): unknown     Past Medical History:   Diagnosis Date    Anxiety disorder, unspecified     CMTS (Charcot-Toya-Tooth syndrome)     Hypertension     Muscular dystrophy     Smoker      Past Surgical History:   Procedure Laterality Date    CHOLECYSTECTOMY      HIP REPLACEMENT ARTHROPLASTY      HYSTERECTOMY       Family History   Problem Relation Age of Onset    Hypertension Mother     Coronary artery disease Mother      Social History     Tobacco Use    Smoking status: Heavy Smoker     Packs/day: 0.50     Types: Cigarettes    Smokeless tobacco: Never   Substance Use Topics    Alcohol use: Not Currently    Drug use: Never     Review of Systems    Physical Exam     Initial Vitals [03/12/23 1617]   BP Pulse Resp Temp SpO2   (!) 165/61 102 (!) 24 98.1 °F (36.7 °C) 100 %      MAP       --         Physical Exam    Nursing note and vitals reviewed.  Constitutional: Vital signs are normal.   Frail-appearing.   HENT:   Head: Normocephalic and atraumatic.   Nose: Nose normal.   Mouth/Throat: Uvula is midline.  Mucous membranes are dry.   Eyes: EOM are normal. Pupils are equal, round, and reactive to light.   Neck: Trachea normal. Neck supple.   Normal range of motion.  Cardiovascular:  Normal rate, regular rhythm and normal pulses.           Pulmonary/Chest: Effort normal. Tachypnea noted. She has decreased breath sounds.   Pursed lip breathing.   Abdominal: Abdomen is soft. Bowel sounds are normal. There is no rebound and no guarding.   Musculoskeletal:         General: Normal range of motion.      Cervical back: Normal range of motion and neck supple.     Neurological: She is alert and oriented to person, place, and time. GCS score is 15. GCS eye subscore is 4. GCS verbal subscore is 5. GCS motor subscore is 6.   Skin: Skin is warm and dry.   Psychiatric: She has a normal mood and affect. Her speech is normal. Thought content normal.       ED Course   Procedures  Labs Reviewed   COMPREHENSIVE METABOLIC PANEL - Abnormal; Notable for the following components:       Result Value    Sodium Level 134 (*)     Chloride 93 (*)     Blood Urea Nitrogen 7.0 (*)     Creatinine 0.43 (*)     All other components within normal limits   CBC WITH DIFFERENTIAL - Abnormal; Notable for the following components:    RBC 4.11 (*)     Hgb 11.8 (*)     MCHC 31.7 (*)     Lymph # 0.43 (*)     All other components within normal limits   TROPONIN I - Normal   B-TYPE NATRIURETIC PEPTIDE - Normal   PROTIME-INR - Normal   APTT - Normal   CBC W/ AUTO DIFFERENTIAL    Narrative:     The following orders were created for panel order CBC auto differential.  Procedure                               Abnormality         Status                     ---------                               -----------         ------                     CBC with Differential[[id number]]        Abnormal            Final result                 Please view results for these tests on the individual orders.     EKG Readings: (Independently Interpreted)   Initial Reading: No STEMI.  Previous EKG: Compared with most recent EKG Previous EKG Date: 4 may 22. Rhythm: Normal Sinus Rhythm. Ectopy: No Ectopy. Conduction: RBBB.   EKG shows a normal sinus rhythm with incomplete right bundle-branch block.  96 per min  Heart rate today is 10 beats faster than it was in May of last year.  May 4th.  The right bundle-branch block is not new PACs that  she had then or not present on today's tracing   ECG Results              EKG 12-lead (In process)  Result time 03/12/23 17:56:08      In process by Interface, Lab In [facility] (03/12/23 17:56:08)                   Narrative:    Test Reason : R06.02,    Vent. Rate : 096 BPM     Atrial Rate : 096 BPM     P-R Int : 126 ms          QRS Dur : 106 ms      QT Int : 350 ms       P-R-T Axes : 092 087 074 degrees     QTc Int : 442 ms    Normal sinus rhythm  Incomplete right bundle branch block  Borderline Abnormal ECG  When compared with ECG of 15-SEP-2022 14:10,  No significant change was found    Referred By: [email or web address]   SELF           Confirmed By:                                   Imaging Results              X-Ray Chest AP Portable (Final result)  Result time 03/12/23 17:52:54      Final result by Lavon Mesa MD (03/12/23 17:52:54)                   Impression:      No acute cardiopulmonary process.      Electronically signed by: Lavon Mesa  Date:    03/12/2023  Time:    17:52               Narrative:    EXAMINATION:  XR CHEST AP PORTABLE    CLINICAL HISTORY:  CHF;    TECHNIQUE:  Single view of the chest    COMPARISON:  09/15/2022    FINDINGS:  No focal opacification.  The cardiomediastinal silhouette is unchanged.  No acute osseous abnormality.                                       Medications   albuterol-ipratropium 2.5 mg-0.5 mg/3 mL nebulizer solution 3 mL (3 mLs Nebulization Given 3/12/23 1850)   predniSONE tablet 60 mg (60 mg Oral Given 3/12/23 1833)   0.9%  NaCl infusion (0 mLs Intravenous Stopped 3/12/23 1936)   amoxicillin-clavulanate 875-125mg per  tablet 1 tablet (1 tablet Oral Given 3/12/23 2047)     Medical Decision Making:   Initial Assessment:   78-year-old female who presents with progressively worsening shortness of breath over the past 2 days having increasing productive cough and increasing oxygen requirement stating she normally only uses oxygen at night but is now having to use it continuously.  She has a diminished lung sounds on examination so she was given DuoNeb treatment along with prednisone with improvement in her breathing, no longer tachypneic but still having some pursed lip breathing.  Chest x-ray shows no acute intrathoracic process.  CBC shows no leukocytosis or leukopenia.  CMP is grossly unremarkable.  BNP and troponin are normal.  ECG shows no evidence of STEMI.  Discussed admission versus outpatient treatment, and patient states he would prefer to be discharged and will follow up with her PCP tomorrow. Given the severity of her COPD exacerbation and feel that she would benefit from antibiotics, so will start her on Augmentin along with prednisone 60 mg for 5 days and will also provide refill for her DuoNeb treatment to continue at home.  Instructed to follow up with her PCP tomorrow and given strict ED return precautions.  I have spoken with the patient and/or caregivers. I have explained the patient's condition, diagnoses and treatment plan based on the information available to me at this time. I have answered the patient's and/or caregiver's questions and addressed any concerns. The patient and/or caregivers have as good an understanding of the patient's diagnosis, condition and treatment plan as can be expected at this point. The vital signs have been stable. The patient's condition is stable and appropriate for discharge from the emergency department.     The patient will pursue further outpatient evaluation with the primary care physician or other designated or consulting physician as outlined in the discharge instructions. The  patient and/or caregivers are agreeable to this plan of care and follow-up instructions have been explained in detail. The patient and/or caregivers have received these instructions in written format and have expressed an understanding of the discharge instructions. The patient and/or caregivers are aware that any significant change in condition or worsening of symptoms should prompt an immediate return to this or the closest emergency department or a call to 911.  Clinical Tests:   Lab Tests: Ordered and Reviewed  Radiological Study: Ordered and Reviewed  Medical Tests: Ordered and Reviewed           ED Course as of 03/13/23 0248   [place] Mar 12, 2023   2013 Reports breathing better after duoneb. States she is out of duoneb solution at home. Lung sounds improved.  [IB]      ED Course User Index  [IB] Kody Patel DO                 Clinical Impression:   Final diagnoses:  [R06.02] Shortness of breath  [J44.1] COPD exacerbation (Primary)        ED Disposition Condition    Discharge Stable          ED Prescriptions       Medication Sig Dispense Start Date End Date Auth. Provider    albuterol-ipratropium (DUO-NEB) 2.5 mg-0.5 mg/3 mL nebulizer solution Take 3 mLs by nebulization every 6 (six) hours as needed for Wheezing. Rescue 75 mL 3/12/2023 4/11/2023 Kody Patel DO    predniSONE (DELTASONE) 20 MG tablet Take 3 tablets (60 mg total) by mouth once daily. for 5 days 15 tablet 3/12/2023 3/17/2023 Kody Patel DO    amoxicillin-clavulanate 875-125mg (AUGMENTIN) 875-125 mg per tablet Take 1 tablet by mouth 2 (two) times daily. 14 tablet 3/12/2023 -- Kody Patel DO          Follow-up Information       Follow up With Specialties Details Why Contact Info    Panchito De La Torre III, MD Internal Medicine   [facility]  [address]  [place] [address] [phone]               Kody Patel DO  03/13/23 0248       Kody Patel DO  03/13/23 0250

## 2023-11-13 NOTE — H&P
Patient:   Lejeune, Betty             MRN: [id number]            FIN: [id number]               Age:   73 years     Sex:  Female     :  1945   Associated Diagnoses:   None   Author:   Garfield Eagle MD      Patient: Betty Lejeune  YOB: 1945  Age: 73y  Gender: Female  Referring Physician: Jenaro Mix MD    Chief Complaint: evalaution of left ear lesion    Historian: patient who is a good historian.     Present Illness:   73-year-old white female presents today for evaluation of a tender lesion involving her left ear.  This lesion has been present for several months.  It makes it difficult for the patient to sleep on that ear due to the tenderness associated with the lesion.  She had indicated that the past she had received a prescription for some type of cream placed over the ear which may have resulted in some reduction of the tenderness but did not eliminate it.  She has not had any other treatment or evaluation for this lesion.  She does not have a history of skin cancer.  She does not have a history of trauma to the ear.    Past Medical History:  Coronary artery disease.   COPD.   Seasonal allergy.  Carotid vascular disease  Muscular Dystrophy  tinnitus/ear pain    Past Surgical History:   Cholecystecomy.   Hysterectomy.   Breast lumpectomy.       Current Medications:  Performed Reconciliation  Active Medications   amlodipine/Darrius 5mg .    Aspirin (81 Mg).    Tylenol 500mg . Take as needed by mouth. Take as needed by mouth..   vitamin e 400 .    Zetia 10mg .    Allergy Relief  . Take 1 tablet(s) By Mouth Daily. Do not substitute. (null).   Omeprazole 20 mg. Take 1 tablet(s) By Mouth Daily. Do not substitute. (null).   Tamsulosin 0.4 mg. Take 1 capsule(s) By Mouth Nightly. Do not substitute. (null).   Chlordiazepoxide-clidinium  . Take 1 2.5 mg prn capsule(s) By Mouth As Directed. Do not substitute. (null).   Premarin  . Take 0.625mg Daily. Do not substitute. (null).    ----- Message from Jeffrey Chaparro MD sent at 11/13/2023 12:52 PM CST -----  Recommend repeat biopsy please. Had borderline and never really got better after treatment.   ----- Message -----  From: Makayla Wu RN  Sent: 11/13/2023  12:50 PM CST  To: Jeffrey Chaparro MD    Continued elevated creatinine with tac in goal range     Albuterol 0.083% (2.5mg/3mL).       Allergies:  Performed Reconciliation   Codeine (Mild): Reactions: None noted.    Pravastatin (Mild): Reactions: None noted.    Vytorin (Mild): Reactions: None noted.     Review of Systems:  Review of systems is unremarkable except as mentioned above.     Social History:  Patient currently smokes cigarettes. Patient smokes 1 pack of cigarettes per day for 40 years. Patient denies alcohol use. Patient denies the use of illicit drugs. Patient is retired. Patient is .     Family History:  There is no family history of bleeding diathesis.   There is no family history of anesthetic complications.   There is a family history of heart disease.   There is a family history of hypertension.   History of cancer. There is a family history of diabetes.   kidney disease  Vitals:   Weight: 87.0 lbs  Temperature: 97.8° F  Heart rate: 82 bpm  Blood pressure: 117 / 67 mmHg      Physical Exam:  General: Well-developed well-nourished female in no acute distress.  Voice is normal.  Head and face: Normocephalic.  No facial lesions.  Ears: Left earthere is an area of erythema with central ulceration involving the midportion of the pinna of the ear involving the helix and adjacent antihelix.  This area is surrounded by some erythema and is mildly tender to palpation.  The remainder of the pinna is unremarkable.  External auditory canals clear.  Tympanic membranes are nonerythematous.  No middle ear effusions. Right earpinna          is normally developed.  External auditory canal is clear.  Tympanic membrane is nonerythematous.  No middle ear effusion.  Nose: Nasal dorsum is unremarkable.  No significant septal deviation.  No significant intranasal congestion.  Secretions are clear.  Oral cavity and oropharynx: Tongue and floor of mouth are unremarkable.  Lips are normal in appearance.  No pharyngeal erythema or exudates.  No masses of the oral cavity or oropharynx.  Neck: Supple without  adenopathy or thyromegaly.  Trachea is in the midline.  Parotid and submandibular glands are normal to palpation.  No palpable masses.  Chest: Clear to auscultation.  No abnormal chest sounds.  Cardiovascular: Regular rate and rhythm without murmurs.  Abdomen: Nondistended.  Extremities: No cyanosis, clubbing, or edema.  Eyes: Extraocular muscles are intact.  No periorbital edema or ecchymosis.  Sclerae are anicteric.  Neurologic: Alert and oriented.  Cranial nerves II through XII are grossly normal.    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    Plan:  Patient is scheduled for excision of this lesion with frozen section analysis on 05/10/2018.  Plan for repair with a helical advancement flap.

## 2024-01-26 ENCOUNTER — APPOINTMENT (OUTPATIENT)
Dept: LAB | Facility: HOSPITAL | Age: 79
End: 2024-01-26
Attending: INTERNAL MEDICINE
Payer: MEDICARE

## 2024-01-26 DIAGNOSIS — N39.498 OTHER SPECIFIED URINARY INCONTINENCE: Primary | ICD-10-CM

## 2024-01-26 LAB
APPEARANCE UR: ABNORMAL
BACTERIA #/AREA URNS AUTO: ABNORMAL /HPF
BILIRUB UR QL STRIP.AUTO: NEGATIVE
COLOR UR AUTO: YELLOW
GLUCOSE UR QL STRIP.AUTO: NEGATIVE
KETONES UR QL STRIP.AUTO: ABNORMAL
LEUKOCYTE ESTERASE UR QL STRIP.AUTO: ABNORMAL
NITRITE UR QL STRIP.AUTO: POSITIVE
PH UR STRIP.AUTO: 6.5 [PH]
PROT UR QL STRIP.AUTO: ABNORMAL
RBC #/AREA URNS AUTO: ABNORMAL /HPF
RBC UR QL AUTO: ABNORMAL
SP GR UR STRIP.AUTO: >=1.03 (ref 1–1.03)
SQUAMOUS #/AREA URNS AUTO: ABNORMAL /HPF
UROBILINOGEN UR STRIP-ACNC: 2
WBC #/AREA URNS AUTO: ABNORMAL /HPF

## 2024-01-26 PROCEDURE — 87086 URINE CULTURE/COLONY COUNT: CPT

## 2024-01-26 PROCEDURE — 81003 URINALYSIS AUTO W/O SCOPE: CPT

## 2024-01-29 LAB — BACTERIA UR CULT: NORMAL

## 2024-02-20 ENCOUNTER — HOSPITAL ENCOUNTER (OUTPATIENT)
Dept: RADIOLOGY | Facility: HOSPITAL | Age: 79
Discharge: HOME | End: 2024-02-20
Attending: INTERNAL MEDICINE
Payer: MEDICARE

## 2024-02-20 DIAGNOSIS — R09.02 HYPOXEMIA: ICD-10-CM

## 2024-02-20 PROCEDURE — 71046 X-RAY EXAM CHEST 2 VIEWS: CPT | Mod: TC

## 2024-04-15 ENCOUNTER — HOSPITAL ENCOUNTER (EMERGENCY)
Facility: HOSPITAL | Age: 79
Discharge: HOME | End: 2024-04-16
Attending: INTERNAL MEDICINE
Payer: MEDICARE

## 2024-04-15 DIAGNOSIS — K59.00 CONSTIPATION, UNSPECIFIED CONSTIPATION TYPE: ICD-10-CM

## 2024-04-15 DIAGNOSIS — R31.9 URINARY TRACT INFECTION WITH HEMATURIA, SITE UNSPECIFIED: Primary | ICD-10-CM

## 2024-04-15 DIAGNOSIS — R10.9 LEFT SIDED ABDOMINAL PAIN: ICD-10-CM

## 2024-04-15 DIAGNOSIS — N39.0 URINARY TRACT INFECTION WITH HEMATURIA, SITE UNSPECIFIED: Primary | ICD-10-CM

## 2024-04-15 LAB
ALBUMIN SERPL-MCNC: 3.6 G/DL (ref 3.4–4.8)
ALBUMIN/GLOB SERPL: 1.2 RATIO (ref 1.1–2)
ALP SERPL-CCNC: 82 UNIT/L (ref 40–150)
ALT SERPL-CCNC: 17 UNIT/L (ref 0–55)
APPEARANCE UR: CLEAR
AST SERPL-CCNC: 18 UNIT/L (ref 5–34)
BACTERIA #/AREA URNS AUTO: ABNORMAL /HPF
BASOPHILS # BLD AUTO: 0.04 X10(3)/MCL
BASOPHILS NFR BLD AUTO: 0.7 %
BILIRUB SERPL-MCNC: 0.1 MG/DL
BILIRUB UR QL STRIP.AUTO: NEGATIVE
BUN SERPL-MCNC: 13 MG/DL (ref 9.8–20.1)
CALCIUM SERPL-MCNC: 9.2 MG/DL (ref 8.4–10.2)
CHLORIDE SERPL-SCNC: 100 MMOL/L (ref 98–107)
CO2 SERPL-SCNC: 29 MMOL/L (ref 23–31)
COLOR UR AUTO: YELLOW
CREAT SERPL-MCNC: 0.6 MG/DL (ref 0.55–1.02)
EOSINOPHIL # BLD AUTO: 0.14 X10(3)/MCL (ref 0–0.9)
EOSINOPHIL NFR BLD AUTO: 2.3 %
ERYTHROCYTE [DISTWIDTH] IN BLOOD BY AUTOMATED COUNT: 12.3 % (ref 11.5–17)
GFR SERPLBLD CREATININE-BSD FMLA CKD-EPI: >60 MLS/MIN/1.73/M2
GLOBULIN SER-MCNC: 2.9 GM/DL (ref 2.4–3.5)
GLUCOSE SERPL-MCNC: 150 MG/DL (ref 82–115)
GLUCOSE UR QL STRIP.AUTO: NEGATIVE
HCT VFR BLD AUTO: 33.1 % (ref 37–47)
HGB BLD-MCNC: 10.6 G/DL (ref 12–16)
IMM GRANULOCYTES # BLD AUTO: 0.02 X10(3)/MCL (ref 0–0.04)
IMM GRANULOCYTES NFR BLD AUTO: 0.3 %
KETONES UR QL STRIP.AUTO: NEGATIVE
LEUKOCYTE ESTERASE UR QL STRIP.AUTO: ABNORMAL
LYMPHOCYTES # BLD AUTO: 1.42 X10(3)/MCL (ref 0.6–4.6)
LYMPHOCYTES NFR BLD AUTO: 23.1 %
MCH RBC QN AUTO: 28.9 PG (ref 27–31)
MCHC RBC AUTO-ENTMCNC: 32 G/DL (ref 33–36)
MCV RBC AUTO: 90.2 FL (ref 80–94)
MONOCYTES # BLD AUTO: 0.5 X10(3)/MCL (ref 0.1–1.3)
MONOCYTES NFR BLD AUTO: 8.1 %
NEUTROPHILS # BLD AUTO: 4.03 X10(3)/MCL (ref 2.1–9.2)
NEUTROPHILS NFR BLD AUTO: 65.5 %
NITRITE UR QL STRIP.AUTO: NEGATIVE
PH UR STRIP.AUTO: 7 [PH]
PLATELET # BLD AUTO: 267 X10(3)/MCL (ref 130–400)
PMV BLD AUTO: 9.1 FL (ref 7.4–10.4)
POTASSIUM SERPL-SCNC: 3.9 MMOL/L (ref 3.5–5.1)
PROT SERPL-MCNC: 6.5 GM/DL (ref 5.8–7.6)
PROT UR QL STRIP.AUTO: NEGATIVE
RBC # BLD AUTO: 3.67 X10(6)/MCL (ref 4.2–5.4)
RBC #/AREA URNS AUTO: ABNORMAL /HPF
RBC UR QL AUTO: ABNORMAL
SODIUM SERPL-SCNC: 135 MMOL/L (ref 136–145)
SP GR UR STRIP.AUTO: 1.02 (ref 1–1.03)
SQUAMOUS #/AREA URNS AUTO: ABNORMAL /HPF
UROBILINOGEN UR STRIP-ACNC: 1
WBC # SPEC AUTO: 6.15 X10(3)/MCL (ref 4.5–11.5)
WBC #/AREA URNS AUTO: ABNORMAL /HPF

## 2024-04-15 PROCEDURE — 85025 COMPLETE CBC W/AUTO DIFF WBC: CPT | Performed by: INTERNAL MEDICINE

## 2024-04-15 PROCEDURE — 81001 URINALYSIS AUTO W/SCOPE: CPT | Performed by: INTERNAL MEDICINE

## 2024-04-15 PROCEDURE — 99285 EMERGENCY DEPT VISIT HI MDM: CPT | Mod: 25

## 2024-04-15 PROCEDURE — 80053 COMPREHEN METABOLIC PANEL: CPT | Performed by: INTERNAL MEDICINE

## 2024-04-15 PROCEDURE — 87086 URINE CULTURE/COLONY COUNT: CPT | Performed by: INTERNAL MEDICINE

## 2024-04-16 VITALS
WEIGHT: 90 LBS | SYSTOLIC BLOOD PRESSURE: 169 MMHG | RESPIRATION RATE: 17 BRPM | BODY MASS INDEX: 17.58 KG/M2 | OXYGEN SATURATION: 99 % | DIASTOLIC BLOOD PRESSURE: 70 MMHG | TEMPERATURE: 99 F | HEART RATE: 83 BPM

## 2024-04-16 PROCEDURE — 25000003 PHARM REV CODE 250: Performed by: INTERNAL MEDICINE

## 2024-04-16 PROCEDURE — 63600175 PHARM REV CODE 636 W HCPCS: Performed by: INTERNAL MEDICINE

## 2024-04-16 PROCEDURE — 96374 THER/PROPH/DIAG INJ IV PUSH: CPT

## 2024-04-16 PROCEDURE — 96375 TX/PRO/DX INJ NEW DRUG ADDON: CPT

## 2024-04-16 RX ORDER — LACTULOSE 10 G/15ML
20 SOLUTION ORAL
Status: COMPLETED | OUTPATIENT
Start: 2024-04-16 | End: 2024-04-16

## 2024-04-16 RX ORDER — NITROFURANTOIN 25; 75 MG/1; MG/1
100 CAPSULE ORAL 2 TIMES DAILY
Qty: 10 CAPSULE | Refills: 0 | Status: SHIPPED | OUTPATIENT
Start: 2024-04-16 | End: 2024-04-21

## 2024-04-16 RX ORDER — CEFTRIAXONE 1 G/1
1 INJECTION, POWDER, FOR SOLUTION INTRAMUSCULAR; INTRAVENOUS
Status: COMPLETED | OUTPATIENT
Start: 2024-04-16 | End: 2024-04-16

## 2024-04-16 RX ORDER — KETOROLAC TROMETHAMINE 30 MG/ML
15 INJECTION, SOLUTION INTRAMUSCULAR; INTRAVENOUS
Status: COMPLETED | OUTPATIENT
Start: 2024-04-16 | End: 2024-04-16

## 2024-04-16 RX ADMIN — CEFTRIAXONE SODIUM 1 G: 1 INJECTION, POWDER, FOR SOLUTION INTRAMUSCULAR; INTRAVENOUS at 01:04

## 2024-04-16 RX ADMIN — LACTULOSE 20 G: 20 SOLUTION ORAL at 01:04

## 2024-04-16 RX ADMIN — KETOROLAC TROMETHAMINE 15 MG: 30 INJECTION, SOLUTION INTRAMUSCULAR at 01:04

## 2024-04-16 NOTE — ED PROVIDER NOTES
Encounter Date: 4/15/2024       History     Chief Complaint   Patient presents with    Back Pain     Reports began at noon with pain to L side of back and flank. 30mg toradol given in route.      79-year-old white female presents with left flank pain and left lower quadrant abdominal pain that started yesterday afternoon      Review of patient's allergies indicates:   Allergen Reactions    Aspirin      Other reaction(s): Unable to tolerate  325 mg dose    Codeine      Other reaction(s): Irregular Heart Rate, unknown    Cortisone      Other reaction(s): unknown    Ezetimibe-simvastatin     Pravastatin      Other reaction(s): unknown     Past Medical History:   Diagnosis Date    Anxiety disorder, unspecified     CMTS (Charcot-Toya-Tooth syndrome)     Hypertension     Muscular dystrophy     Smoker      Past Surgical History:   Procedure Laterality Date    CHOLECYSTECTOMY      HIP REPLACEMENT ARTHROPLASTY      HYSTERECTOMY       Family History   Problem Relation Name Age of Onset    Hypertension Mother      Coronary artery disease Mother       Social History     Tobacco Use    Smoking status: Heavy Smoker     Current packs/day: 0.50     Types: Cigarettes    Smokeless tobacco: Never   Substance Use Topics    Alcohol use: Not Currently    Drug use: Never     Review of Systems   Constitutional: Negative.  Negative for activity change, appetite change, chills, diaphoresis, fatigue, fever and unexpected weight change.   HENT: Negative.  Negative for congestion, dental problem, drooling, ear discharge, ear pain, facial swelling, hearing loss, mouth sores, nosebleeds, postnasal drip, rhinorrhea, sinus pressure, sinus pain, sneezing, sore throat, tinnitus, trouble swallowing and voice change.    Eyes: Negative.  Negative for photophobia, pain, discharge, redness, itching and visual disturbance.   Respiratory: Negative.  Negative for apnea, cough, choking, chest tightness, shortness of breath, wheezing and stridor.     Cardiovascular: Negative.  Negative for chest pain, palpitations and leg swelling.   Gastrointestinal:  Positive for abdominal pain and constipation. Negative for abdominal distention, anal bleeding, blood in stool, diarrhea, nausea, rectal pain and vomiting.   Endocrine: Negative.  Negative for cold intolerance, heat intolerance, polydipsia, polyphagia and polyuria.   Genitourinary:  Positive for flank pain. Negative for decreased urine volume, difficulty urinating, dyspareunia, dysuria, enuresis, frequency, genital sores, hematuria, menstrual problem, pelvic pain, urgency, vaginal bleeding, vaginal discharge and vaginal pain.   Musculoskeletal:  Negative for arthralgias, back pain, gait problem, joint swelling, myalgias, neck pain and neck stiffness.   Skin: Negative.  Negative for color change, pallor, rash and wound.   Allergic/Immunologic: Negative.  Negative for environmental allergies, food allergies and immunocompromised state.   Neurological: Negative.  Negative for dizziness, tremors, seizures, syncope, facial asymmetry, speech difficulty, weakness, light-headedness, numbness and headaches.   Hematological: Negative.  Negative for adenopathy. Does not bruise/bleed easily.   Psychiatric/Behavioral: Negative.  Negative for agitation, behavioral problems, confusion, decreased concentration, dysphoric mood, hallucinations, self-injury, sleep disturbance and suicidal ideas. The patient is not nervous/anxious and is not hyperactive.    All other systems reviewed and are negative.      Physical Exam     Initial Vitals [04/15/24 2247]   BP Pulse Resp Temp SpO2   (!) 210/94 93 18 98.5 °F (36.9 °C) 96 %      MAP       --         Physical Exam    Nursing note and vitals reviewed.  Constitutional: She appears well-developed and well-nourished. She is not diaphoretic. No distress.   HENT:   Head: Normocephalic and atraumatic.   Mouth/Throat: Oropharynx is clear and moist. No oropharyngeal exudate.   Eyes:  Conjunctivae and EOM are normal. Pupils are equal, round, and reactive to light. No scleral icterus.   Neck: Neck supple. No JVD present.   Normal range of motion.  Cardiovascular:  Normal rate, regular rhythm, normal heart sounds and intact distal pulses.     Exam reveals no gallop and no friction rub.       No murmur heard.  Pulmonary/Chest: Breath sounds normal. No respiratory distress. She has no wheezes. She exhibits no tenderness.   Abdominal: Abdomen is soft. Bowel sounds are normal. She exhibits no distension. There is no abdominal tenderness. There is no rebound.   Musculoskeletal:      Cervical back: Normal range of motion and neck supple.      Comments: Severe arthritic changes of the joints of the upper and lower extremities     Neurological: She is alert and oriented to person, place, and time. She has normal strength and normal reflexes.   Skin: Skin is warm and dry. Capillary refill takes less than 2 seconds.   Psychiatric: She has a normal mood and affect. Her behavior is normal. Judgment and thought content normal.         ED Course   Procedures  Admission on 04/15/2024   Component Date Value Ref Range Status    Sodium Level 04/15/2024 135 (L)  136 - 145 mmol/L Final    Potassium Level 04/15/2024 3.9  3.5 - 5.1 mmol/L Final    Chloride 04/15/2024 100  98 - 107 mmol/L Final    Carbon Dioxide 04/15/2024 29  23 - 31 mmol/L Final    Glucose Level 04/15/2024 150 (H)  82 - 115 mg/dL Final    Blood Urea Nitrogen 04/15/2024 13.0  9.8 - 20.1 mg/dL Final    Creatinine 04/15/2024 0.60  0.55 - 1.02 mg/dL Final    Calcium Level Total 04/15/2024 9.2  8.4 - 10.2 mg/dL Final    Protein Total 04/15/2024 6.5  5.8 - 7.6 gm/dL Final    Albumin Level 04/15/2024 3.6  3.4 - 4.8 g/dL Final    Globulin 04/15/2024 2.9  2.4 - 3.5 gm/dL Final    Albumin/Globulin Ratio 04/15/2024 1.2  1.1 - 2.0 ratio Final    Bilirubin Total 04/15/2024 0.1  <=1.5 mg/dL Final    Alkaline Phosphatase 04/15/2024 82  40 - 150 unit/L Final     Alanine Aminotransferase 04/15/2024 17  0 - 55 unit/L Final    Aspartate Aminotransferase 04/15/2024 18  5 - 34 unit/L Final    eGFR 04/15/2024 >60  mls/min/1.73/m2 Final    Color, UA 04/15/2024 Yellow  Yellow, Light-Yellow, Dark Yellow, [place], Straw Final    Appearance, UA 04/15/2024 Clear  Clear Final    Specific Gravity, UA 04/15/2024 1.020  1.005 - 1.030 Final    pH, UA 04/15/2024 7.0  5.0 - 8.5 Final    Protein, UA 04/15/2024 Negative  Negative Final    Glucose, UA 04/15/2024 Negative  Negative, Normal Final    Ketones, UA 04/15/2024 Negative  Negative Final    Blood, UA 04/15/2024 2+ (A)  Negative Final    Bilirubin, UA 04/15/2024 Negative  Negative Final    Urobilinogen, UA 04/15/2024 1.0  0.2, 1.0, Normal Final    Nitrites, UA 04/15/2024 Negative  Negative Final    Leukocyte Esterase, UA 04/15/2024 1+ (A)  Negative Final    WBC 04/15/2024 6.15  4.50 - 11.50 x10(3)/mcL Final    RBC 04/15/2024 3.67 (L)  4.20 - 5.40 x10(6)/mcL Final    Hgb 04/15/2024 10.6 (L)  12.0 - 16.0 g/dL Final    Hct 04/15/2024 33.1 (L)  37.0 - 47.0 % Final    MCV 04/15/2024 90.2  80.0 - 94.0 fL Final    MCH 04/15/2024 28.9  27.0 - 31.0 pg Final    MCHC 04/15/2024 32.0 (L)  33.0 - 36.0 g/dL Final    RDW 04/15/2024 12.3  11.5 - 17.0 % Final    Platelet 04/15/2024 267  130 - 400 x10(3)/mcL Final    MPV 04/15/2024 9.1  7.4 - 10.4 fL Final    Neut % 04/15/2024 65.5  % Final    Lymph % 04/15/2024 23.1  % Final    Mono % 04/15/2024 8.1  % Final    Eos % 04/15/2024 2.3  % Final    Basophil % 04/15/2024 0.7  % Final    Lymph # 04/15/2024 1.42  0.6 - 4.6 x10(3)/mcL Final    Neut # 04/15/2024 4.03  2.1 - 9.2 x10(3)/mcL Final    Mono # 04/15/2024 0.50  0.1 - 1.3 x10(3)/mcL Final    Eos # 04/15/2024 0.14  0 - 0.9 x10(3)/mcL Final    Baso # 04/15/2024 0.04  <=0.2 x10(3)/mcL Final    IG# 04/15/2024 0.02  0 - 0.04 x10(3)/mcL Final    IG% 04/15/2024 0.3  % Final    Bacteria, UA 04/15/2024 Few (A)  None Seen, Rare, Occasional /HPF Final    RBC, UA  04/15/2024 11-20 (A)  None Seen, 0-2, 3-5, 0-5 /HPF Final    WBC, UA 04/15/2024 11-20 (A)  None Seen, 0-2, 3-5, 0-5 /HPF Final    Squamous Epithelial Cells, UA 04/15/2024 Few (A)  None Seen, Rare, Occasional, Occ /HPF Final       Labs Reviewed   COMPREHENSIVE METABOLIC PANEL - Abnormal; Notable for the following components:       Result Value    Sodium Level 135 (*)     Glucose Level 150 (*)     All other components within normal limits   URINALYSIS, REFLEX TO URINE CULTURE - Abnormal; Notable for the following components:    Blood, UA 2+ (*)     Leukocyte Esterase, UA 1+ (*)     All other components within normal limits   CBC WITH DIFFERENTIAL - Abnormal; Notable for the following components:    RBC 3.67 (*)     Hgb 10.6 (*)     Hct 33.1 (*)     MCHC 32.0 (*)     All other components within normal limits   URINALYSIS, MICROSCOPIC - Abnormal; Notable for the following components:    Bacteria, UA Few (*)     RBC, UA 11-20 (*)     WBC, UA 11-20 (*)     Squamous Epithelial Cells, UA Few (*)     All other components within normal limits   CULTURE, URINE   CBC W/ AUTO DIFFERENTIAL    Narrative:     The following orders were created for panel order CBC auto differential.  Procedure                               Abnormality         Status                     ---------                               -----------         ------                     CBC with Differential[[id number]]        Abnormal            Final result                 Please view results for these tests on the individual orders.          Imaging Results              CT Renal Stone Study ABD Pelvis WO (Preliminary result)  Result time 04/16/24 00:30:55      Preliminary result by Yossi Escamilla MD (04/16/24 00:30:55)                   Narrative:    START OF REPORT:  Technique: CT of the abdomen and pelvis was performed with axial images as well as sagittal and coronal reconstruction images without intravenous contrast.    Comparison: None  available.    Clinical History: Back pain.    Dosage Information: Automated Exposure Control was utilized 191.77 mGy.cm.    Findings:  Lines and Tubes: None.  Thorax:  Lungs: Severe emphysematous changes are seen at the lung bases. There is a 7 mm soft tissue density nodule in the right middle lobe (series 2, image 1). There is mild scarring at the lung base.  Pleura: No pleural effusion is seen.  Heart: The heart size is within normal limits.  Abdomen:  Abdominal Wall: No abdominal wall pathology is seen.  Liver: The liver appears unremarkable.  Gallbladder: Surgical clips are seen in the gallbladder fossa consistent with prior cholecystectomy correlate with surgical history and visual inspection.  Pancreas: The pancreas appears unremarkable.  Spleen: The spleen appears unremarkable.  Adrenals: The left adrenal gland is unremarkable. A 2.5 x 1.3 cm hypodense lesion is seen in the right adrenal gland (series 2, image 21). This likely reflects an adenoma.  Kidneys: The kidneys appear unremarkable with no stones cysts masses or hydronephrosis.  Aorta: There is mild calcification of the abdominal aorta and its branches.  IVC: Unremarkable.  Bowel:  Esophagus: The visualized esophagus appears unremarkable.  Stomach: The stomach appears unremarkable.  Duodenum: Unremarkable appearing duodenum.  Small Bowel: The small bowel appears unremarkable.  Colon: There is moderate stool in the colon which could reflect an element of constipation.  Appendix: There are appendicoliths in the normal caliber appendix (series 2, images 77-88). The appendix otherwise appears unremarkable.  Peritoneum: No intraperitoneal free air or ascites is seen.    Pelvis:  Bladder: The bladder appears unremarkable.  Female:  Uterus: The uterus is surgically absent.  Ovaries: No adnexal masses are seen.    Bony structures: The bones are markedly osteopenic.  Dorsal Spine: Mild dextoscoliosis of the thoracolumbar spine with degenerative changes.  Bony  Pelvis: Orthopedic screws are seen in the head and neck of the left femur.      Impression:  1. There is moderate stool in the colon which could reflect an element of constipation.  2. No acute intraabdominal or pelvic pathology is identified. Details and other findings as discussed above.                                         Medications   ketorolac injection 15 mg (15 mg Intravenous Given 4/16/24 0108)   cefTRIAXone injection 1 g (1 g Intravenous Given 4/16/24 0108)   lactulose 20 gram/30 mL solution Soln 20 g (20 g Oral Given 4/16/24 0109)     Medical Decision Making  79-year-old white female with left flank pain and left lower quadrant abdominal pain.  Differential diagnosis included urinary tract infection, nephrolithiasis, ureterolithiasis, pyelonephritis, constipation, diverticulitis, arthritis of the lumbar spine, musculoskeletal pain.  Workup included blood work and urinalysis which shows a urinary tract infection and because of the blood in her urine I have ordered a CT with stone protocol.  CT does not show any evidence of stone however there is a moderate amount of constipation.  She also has chronic lumbar spine arthritic changes noted so will give another dose of Toradol some lactulose for constipation and I will treat with a g of Rocephin in the emergency department for urinary tract infection and then send some Macrobid to the pharmacy    Problems Addressed:  Constipation, unspecified constipation type: chronic illness or injury  Left sided abdominal pain: acute illness or injury  Urinary tract infection with hematuria, site unspecified: acute illness or injury with systemic symptoms    Amount and/or Complexity of Data Reviewed  Independent Historian: caregiver and EMS  External Data Reviewed: labs, radiology and notes.  Labs: ordered. Decision-making details documented in ED Course.  Radiology: ordered. Decision-making details documented in ED Course.    Risk  OTC drugs.  Prescription drug  management.  Diagnosis or treatment significantly limited by social determinants of health.                                      Clinical Impression:  Final diagnoses:  [N39.0, R31.9] Urinary tract infection with hematuria, site unspecified (Primary)  [K59.00] Constipation, unspecified constipation type  [R10.9] Left sided abdominal pain          ED Disposition Condition    Discharge Stable          ED Prescriptions       Medication Sig Dispense Start Date End Date Auth. Provider    nitrofurantoin, macrocrystal-monohydrate, (MACROBID) 100 MG capsule Take 1 capsule (100 mg total) by mouth 2 (two) times daily. for 5 days 10 capsule 4/16/2024 4/21/2024 Alek Platt MD          Follow-up Information       Follow up With Specialties Details Why Contact Info    Panchito De La Torre III, MD Internal Medicine In 3 days  [address]  [address]  [place] [address]  [phone]               Alek Platt MD  04/16/24 0112

## 2024-04-18 LAB — BACTERIA UR CULT: NORMAL

## 2024-07-09 DIAGNOSIS — Z78.0 ASYMPTOMATIC MENOPAUSAL STATE: Primary | ICD-10-CM

## 2024-07-09 DIAGNOSIS — Z87.891 PERSONAL HISTORY OF TOBACCO USE, PRESENTING HAZARDS TO HEALTH: Primary | ICD-10-CM

## 2024-07-12 ENCOUNTER — HOSPITAL ENCOUNTER (OUTPATIENT)
Dept: RADIOLOGY | Facility: HOSPITAL | Age: 79
Discharge: HOME | End: 2024-07-12
Attending: INTERNAL MEDICINE
Payer: MEDICARE

## 2024-07-12 DIAGNOSIS — Z78.0 ASYMPTOMATIC MENOPAUSAL STATE: ICD-10-CM

## 2024-07-12 DIAGNOSIS — Z87.891 PERSONAL HISTORY OF TOBACCO USE, PRESENTING HAZARDS TO HEALTH: ICD-10-CM

## 2024-07-12 PROCEDURE — 77080 DXA BONE DENSITY AXIAL: CPT | Mod: TC

## 2024-07-12 PROCEDURE — 71271 CT THORAX LUNG CANCER SCR C-: CPT | Mod: TC

## 2024-07-21 ENCOUNTER — HOSPITAL ENCOUNTER (INPATIENT)
Facility: HOSPITAL | Age: 79
LOS: 2 days | Discharge: HOME HEALTH | DRG: 191 | End: 2024-07-24
Attending: INTERNAL MEDICINE | Admitting: INTERNAL MEDICINE
Payer: MEDICARE

## 2024-07-21 DIAGNOSIS — R06.02 SOB (SHORTNESS OF BREATH): ICD-10-CM

## 2024-07-21 DIAGNOSIS — R07.9 CHEST PAIN: ICD-10-CM

## 2024-07-21 DIAGNOSIS — J44.1 COPD WITH ACUTE EXACERBATION: Primary | ICD-10-CM

## 2024-07-21 PROBLEM — E78.00 HYPERCHOLESTEROLEMIA: Status: ACTIVE | Noted: 2024-07-21

## 2024-07-21 PROBLEM — J44.9 CHRONIC OBSTRUCTIVE PULMONARY DISEASE: Status: ACTIVE | Noted: 2024-07-21

## 2024-07-21 PROBLEM — J45.909 ASTHMA: Status: ACTIVE | Noted: 2024-07-21

## 2024-07-21 PROBLEM — I10 HYPERTENSION: Status: ACTIVE | Noted: 2024-07-21

## 2024-07-21 LAB
ALBUMIN SERPL-MCNC: 4.2 G/DL (ref 3.4–4.8)
ALBUMIN/GLOB SERPL: 1.2 RATIO (ref 1.1–2)
ALP SERPL-CCNC: 95 UNIT/L (ref 40–150)
ALT SERPL-CCNC: 18 UNIT/L (ref 0–55)
ANION GAP SERPL CALC-SCNC: 16 MEQ/L
AST SERPL-CCNC: 21 UNIT/L (ref 5–34)
BASOPHILS # BLD AUTO: 0.03 X10(3)/MCL
BASOPHILS NFR BLD AUTO: 0.3 %
BILIRUB SERPL-MCNC: 0.7 MG/DL
BNP BLD-MCNC: 141.6 PG/ML
BUN SERPL-MCNC: 16 MG/DL (ref 9.8–20.1)
CALCIUM SERPL-MCNC: 10.5 MG/DL (ref 8.4–10.2)
CHLORIDE SERPL-SCNC: 96 MMOL/L (ref 98–107)
CHOLEST SERPL-MCNC: 269 MG/DL
CHOLEST/HDLC SERPL: 3 {RATIO} (ref 0–5)
CO2 SERPL-SCNC: 29 MMOL/L (ref 23–31)
CREAT SERPL-MCNC: 0.5 MG/DL (ref 0.55–1.02)
CREAT/UREA NIT SERPL: 32
D DIMER PPP IA.FEU-MCNC: 1.17 UG/ML FEU (ref 0–0.5)
EOSINOPHIL # BLD AUTO: 0.22 X10(3)/MCL (ref 0–0.9)
EOSINOPHIL NFR BLD AUTO: 2.1 %
ERYTHROCYTE [DISTWIDTH] IN BLOOD BY AUTOMATED COUNT: 12.9 % (ref 11.5–17)
FLUAV AG UPPER RESP QL IA.RAPID: NOT DETECTED
FLUBV AG UPPER RESP QL IA.RAPID: NOT DETECTED
GFR SERPLBLD CREATININE-BSD FMLA CKD-EPI: >60 ML/MIN/1.73/M2
GLOBULIN SER-MCNC: 3.6 GM/DL (ref 2.4–3.5)
GLUCOSE SERPL-MCNC: 164 MG/DL (ref 82–115)
HCT VFR BLD AUTO: 39.5 % (ref 37–47)
HDLC SERPL-MCNC: 82 MG/DL (ref 35–60)
HGB BLD-MCNC: 12.2 G/DL (ref 12–16)
IMM GRANULOCYTES # BLD AUTO: 0.03 X10(3)/MCL (ref 0–0.04)
IMM GRANULOCYTES NFR BLD AUTO: 0.3 %
LACTATE SERPL-SCNC: 1.5 MMOL/L (ref 0.5–2.2)
LDLC SERPL CALC-MCNC: 169 MG/DL (ref 50–140)
LYMPHOCYTES # BLD AUTO: 1.34 X10(3)/MCL (ref 0.6–4.6)
LYMPHOCYTES NFR BLD AUTO: 12.5 %
MCH RBC QN AUTO: 28.2 PG (ref 27–31)
MCHC RBC AUTO-ENTMCNC: 30.9 G/DL (ref 33–36)
MCV RBC AUTO: 91.2 FL (ref 80–94)
MONOCYTES # BLD AUTO: 0.45 X10(3)/MCL (ref 0.1–1.3)
MONOCYTES NFR BLD AUTO: 4.2 %
NEUTROPHILS # BLD AUTO: 8.63 X10(3)/MCL (ref 2.1–9.2)
NEUTROPHILS NFR BLD AUTO: 80.6 %
PLATELET # BLD AUTO: 248 X10(3)/MCL (ref 130–400)
PMV BLD AUTO: 9.7 FL (ref 7.4–10.4)
POTASSIUM SERPL-SCNC: 3.1 MMOL/L (ref 3.5–5.1)
PROT SERPL-MCNC: 7.8 GM/DL (ref 5.8–7.6)
RBC # BLD AUTO: 4.33 X10(6)/MCL (ref 4.2–5.4)
RSV A 5' UTR RNA NPH QL NAA+PROBE: NOT DETECTED
SARS-COV-2 RNA RESP QL NAA+PROBE: NOT DETECTED
SODIUM SERPL-SCNC: 141 MMOL/L (ref 136–145)
TRIGL SERPL-MCNC: 92 MG/DL (ref 37–140)
TROPONIN I SERPL-MCNC: 0.02 NG/ML (ref 0–0.04)
VLDLC SERPL CALC-MCNC: 18 MG/DL
WBC # BLD AUTO: 10.7 X10(3)/MCL (ref 4.5–11.5)

## 2024-07-21 PROCEDURE — 25000003 PHARM REV CODE 250: Performed by: INTERNAL MEDICINE

## 2024-07-21 PROCEDURE — 93005 ELECTROCARDIOGRAM TRACING: CPT

## 2024-07-21 PROCEDURE — 83880 ASSAY OF NATRIURETIC PEPTIDE: CPT | Performed by: INTERNAL MEDICINE

## 2024-07-21 PROCEDURE — 94761 N-INVAS EAR/PLS OXIMETRY MLT: CPT

## 2024-07-21 PROCEDURE — 99285 EMERGENCY DEPT VISIT HI MDM: CPT | Mod: 25

## 2024-07-21 PROCEDURE — 94640 AIRWAY INHALATION TREATMENT: CPT

## 2024-07-21 PROCEDURE — 25500020 PHARM REV CODE 255: Performed by: INTERNAL MEDICINE

## 2024-07-21 PROCEDURE — 0241U COVID/RSV/FLU A&B PCR: CPT | Performed by: INTERNAL MEDICINE

## 2024-07-21 PROCEDURE — 93010 ELECTROCARDIOGRAM REPORT: CPT | Mod: ,,, | Performed by: INTERNAL MEDICINE

## 2024-07-21 PROCEDURE — 85379 FIBRIN DEGRADATION QUANT: CPT | Performed by: INTERNAL MEDICINE

## 2024-07-21 PROCEDURE — 84484 ASSAY OF TROPONIN QUANT: CPT | Performed by: INTERNAL MEDICINE

## 2024-07-21 PROCEDURE — 27000221 HC OXYGEN, UP TO 24 HOURS

## 2024-07-21 PROCEDURE — 25000242 PHARM REV CODE 250 ALT 637 W/ HCPCS: Performed by: INTERNAL MEDICINE

## 2024-07-21 PROCEDURE — 80053 COMPREHEN METABOLIC PANEL: CPT | Performed by: INTERNAL MEDICINE

## 2024-07-21 PROCEDURE — 87040 BLOOD CULTURE FOR BACTERIA: CPT | Performed by: INTERNAL MEDICINE

## 2024-07-21 PROCEDURE — 83605 ASSAY OF LACTIC ACID: CPT | Performed by: INTERNAL MEDICINE

## 2024-07-21 PROCEDURE — 80061 LIPID PANEL: CPT | Performed by: INTERNAL MEDICINE

## 2024-07-21 PROCEDURE — 63600175 PHARM REV CODE 636 W HCPCS: Performed by: INTERNAL MEDICINE

## 2024-07-21 PROCEDURE — 85025 COMPLETE CBC W/AUTO DIFF WBC: CPT | Performed by: INTERNAL MEDICINE

## 2024-07-21 PROCEDURE — 96374 THER/PROPH/DIAG INJ IV PUSH: CPT

## 2024-07-21 PROCEDURE — 96375 TX/PRO/DX INJ NEW DRUG ADDON: CPT

## 2024-07-21 RX ORDER — BUDESONIDE 0.5 MG/2ML
0.5 INHALANT ORAL
Status: COMPLETED | OUTPATIENT
Start: 2024-07-21 | End: 2024-07-21

## 2024-07-21 RX ORDER — IPRATROPIUM BROMIDE AND ALBUTEROL SULFATE 2.5; .5 MG/3ML; MG/3ML
3 SOLUTION RESPIRATORY (INHALATION)
Status: COMPLETED | OUTPATIENT
Start: 2024-07-21 | End: 2024-07-21

## 2024-07-21 RX ADMIN — CEFTRIAXONE SODIUM 1 G: 1 INJECTION, POWDER, FOR SOLUTION INTRAMUSCULAR; INTRAVENOUS at 11:07

## 2024-07-21 RX ADMIN — IOPAMIDOL 100 ML: 755 INJECTION, SOLUTION INTRAVENOUS at 10:07

## 2024-07-21 RX ADMIN — METHYLPREDNISOLONE SODIUM SUCCINATE 60 MG: 40 INJECTION, POWDER, FOR SOLUTION INTRAMUSCULAR; INTRAVENOUS at 11:07

## 2024-07-21 RX ADMIN — IPRATROPIUM BROMIDE AND ALBUTEROL SULFATE 3 ML: .5; 3 SOLUTION RESPIRATORY (INHALATION) at 09:07

## 2024-07-21 RX ADMIN — BUDESONIDE 0.5 MG: 0.5 INHALANT RESPIRATORY (INHALATION) at 09:07

## 2024-07-21 RX ADMIN — POTASSIUM BICARBONATE 50 MEQ: 977.5 TABLET, EFFERVESCENT ORAL at 10:07

## 2024-07-22 PROBLEM — J44.1 COPD WITH ACUTE EXACERBATION: Status: ACTIVE | Noted: 2024-07-21

## 2024-07-22 PROCEDURE — 25000003 PHARM REV CODE 250: Performed by: INTERNAL MEDICINE

## 2024-07-22 PROCEDURE — 27000221 HC OXYGEN, UP TO 24 HOURS

## 2024-07-22 PROCEDURE — 63600175 PHARM REV CODE 636 W HCPCS: Performed by: INTERNAL MEDICINE

## 2024-07-22 PROCEDURE — 94640 AIRWAY INHALATION TREATMENT: CPT | Mod: XB

## 2024-07-22 PROCEDURE — A4216 STERILE WATER/SALINE, 10 ML: HCPCS | Performed by: INTERNAL MEDICINE

## 2024-07-22 PROCEDURE — 87798 DETECT AGENT NOS DNA AMP: CPT | Performed by: INTERNAL MEDICINE

## 2024-07-22 PROCEDURE — 25000242 PHARM REV CODE 250 ALT 637 W/ HCPCS: Performed by: INTERNAL MEDICINE

## 2024-07-22 PROCEDURE — 21400001 HC TELEMETRY ROOM

## 2024-07-22 PROCEDURE — 87581 M.PNEUMON DNA AMP PROBE: CPT | Performed by: INTERNAL MEDICINE

## 2024-07-22 PROCEDURE — 11000001 HC ACUTE MED/SURG PRIVATE ROOM

## 2024-07-22 PROCEDURE — 94761 N-INVAS EAR/PLS OXIMETRY MLT: CPT

## 2024-07-22 PROCEDURE — 87486 CHLMYD PNEUM DNA AMP PROBE: CPT | Performed by: INTERNAL MEDICINE

## 2024-07-22 RX ORDER — IPRATROPIUM BROMIDE AND ALBUTEROL SULFATE 2.5; .5 MG/3ML; MG/3ML
3 SOLUTION RESPIRATORY (INHALATION) EVERY 6 HOURS PRN
Status: DISCONTINUED | OUTPATIENT
Start: 2024-07-22 | End: 2024-07-24 | Stop reason: HOSPADM

## 2024-07-22 RX ORDER — BUDESONIDE 0.5 MG/2ML
0.5 INHALANT ORAL EVERY 12 HOURS
Status: DISCONTINUED | OUTPATIENT
Start: 2024-07-22 | End: 2024-07-24 | Stop reason: HOSPADM

## 2024-07-22 RX ORDER — PANTOPRAZOLE SODIUM 40 MG/1
40 TABLET, DELAYED RELEASE ORAL DAILY
Status: DISCONTINUED | OUTPATIENT
Start: 2024-07-23 | End: 2024-07-24 | Stop reason: HOSPADM

## 2024-07-22 RX ORDER — ACETAMINOPHEN 325 MG/1
325 TABLET ORAL EVERY 6 HOURS PRN
Status: DISCONTINUED | OUTPATIENT
Start: 2024-07-22 | End: 2024-07-24 | Stop reason: HOSPADM

## 2024-07-22 RX ORDER — VALSARTAN 80 MG/1
160 TABLET ORAL DAILY
Status: DISCONTINUED | OUTPATIENT
Start: 2024-07-23 | End: 2024-07-24 | Stop reason: HOSPADM

## 2024-07-22 RX ORDER — SODIUM CHLORIDE 0.9 % (FLUSH) 0.9 %
10 SYRINGE (ML) INJECTION EVERY 8 HOURS
Status: DISCONTINUED | OUTPATIENT
Start: 2024-07-22 | End: 2024-07-24 | Stop reason: HOSPADM

## 2024-07-22 RX ORDER — IPRATROPIUM BROMIDE AND ALBUTEROL SULFATE 2.5; .5 MG/3ML; MG/3ML
3 SOLUTION RESPIRATORY (INHALATION)
Status: DISCONTINUED | OUTPATIENT
Start: 2024-07-23 | End: 2024-07-24 | Stop reason: HOSPADM

## 2024-07-22 RX ORDER — ACETAMINOPHEN 325 MG/1
650 TABLET ORAL EVERY 8 HOURS PRN
Status: DISCONTINUED | OUTPATIENT
Start: 2024-07-22 | End: 2024-07-24 | Stop reason: HOSPADM

## 2024-07-22 RX ORDER — OMEPRAZOLE 20 MG/1
20 CAPSULE, DELAYED RELEASE ORAL DAILY
COMMUNITY

## 2024-07-22 RX ORDER — DORZOLAMIDE HYDROCHLORIDE AND TIMOLOL MALEATE 20; 5 MG/ML; MG/ML
1 SOLUTION/ DROPS OPHTHALMIC 2 TIMES DAILY
COMMUNITY

## 2024-07-22 RX ORDER — DORZOLAMIDE HYDROCHLORIDE AND TIMOLOL MALEATE 20; 5 MG/ML; MG/ML
1 SOLUTION/ DROPS OPHTHALMIC 2 TIMES DAILY
Status: DISCONTINUED | OUTPATIENT
Start: 2024-07-22 | End: 2024-07-24 | Stop reason: HOSPADM

## 2024-07-22 RX ORDER — IPRATROPIUM BROMIDE AND ALBUTEROL SULFATE 2.5; .5 MG/3ML; MG/3ML
3 SOLUTION RESPIRATORY (INHALATION) EVERY 4 HOURS
Status: DISCONTINUED | OUTPATIENT
Start: 2024-07-22 | End: 2024-07-22

## 2024-07-22 RX ORDER — ASPIRIN 81 MG/1
81 TABLET ORAL NIGHTLY
COMMUNITY

## 2024-07-22 RX ORDER — GABAPENTIN 300 MG/1
300 CAPSULE ORAL 4 TIMES DAILY
COMMUNITY

## 2024-07-22 RX ORDER — DIPHENHYDRAMINE HCL 25 MG
25 TABLET ORAL NIGHTLY
Status: ON HOLD | COMMUNITY
End: 2024-07-22

## 2024-07-22 RX ORDER — GABAPENTIN 300 MG/1
300 CAPSULE ORAL 3 TIMES DAILY
Status: DISCONTINUED | OUTPATIENT
Start: 2024-07-23 | End: 2024-07-24 | Stop reason: HOSPADM

## 2024-07-22 RX ORDER — ACETAMINOPHEN 500 MG/1
1000 CAPSULE, LIQUID FILLED ORAL NIGHTLY
COMMUNITY

## 2024-07-22 RX ORDER — VALSARTAN 160 MG/1
160 TABLET ORAL DAILY
COMMUNITY

## 2024-07-22 RX ADMIN — IPRATROPIUM BROMIDE AND ALBUTEROL SULFATE 3 ML: .5; 3 SOLUTION RESPIRATORY (INHALATION) at 11:07

## 2024-07-22 RX ADMIN — DORZOLAMIDE HYDROCHLORIDE AND TIMOLOL MALEATE 1 DROP: 20; 5 SOLUTION OPHTHALMIC at 09:07

## 2024-07-22 RX ADMIN — BUDESONIDE 0.5 MG: 0.5 INHALANT RESPIRATORY (INHALATION) at 07:07

## 2024-07-22 RX ADMIN — ACETAMINOPHEN 650 MG: 325 TABLET, FILM COATED ORAL at 04:07

## 2024-07-22 RX ADMIN — IPRATROPIUM BROMIDE AND ALBUTEROL SULFATE 3 ML: .5; 3 SOLUTION RESPIRATORY (INHALATION) at 03:07

## 2024-07-22 RX ADMIN — SODIUM CHLORIDE, PRESERVATIVE FREE 10 ML: 5 INJECTION INTRAVENOUS at 06:07

## 2024-07-22 RX ADMIN — IPRATROPIUM BROMIDE AND ALBUTEROL SULFATE 3 ML: .5; 3 SOLUTION RESPIRATORY (INHALATION) at 07:07

## 2024-07-22 RX ADMIN — METHYLPREDNISOLONE SODIUM SUCCINATE 60 MG: 40 INJECTION, POWDER, FOR SOLUTION INTRAMUSCULAR; INTRAVENOUS at 09:07

## 2024-07-22 RX ADMIN — METHYLPREDNISOLONE SODIUM SUCCINATE 60 MG: 40 INJECTION, POWDER, FOR SOLUTION INTRAMUSCULAR; INTRAVENOUS at 08:07

## 2024-07-22 RX ADMIN — SODIUM CHLORIDE, PRESERVATIVE FREE 10 ML: 5 INJECTION INTRAVENOUS at 02:07

## 2024-07-22 NOTE — PROGRESS NOTES
Inpatient Nutrition Evaluation    Admit Date: 7/21/2024   Total duration of encounter: 1 day    Nutrition Recommendation/Prescription     Continue Low Sodium,  2 gm Diet as tolerated.   Monitor lytes and replete as needed.       RD following and available as needed. Thank you.     Nutrition Assessment     Chart Review    Reason Seen: continuous nutrition monitoring    Malnutrition Screening Tool Results   Have you recently lost weight without trying?: No  Have you been eating poorly because of a decreased appetite?: No   MST Score: 0     Diagnosis:  COPD with acute exacerbation.     Relevant Medical History:   Anxiety disorder, unspecified      CMTS (Charcot-Toya-Tooth syndrome)      Hypertension      Muscular dystrophy      Smoker          Nutrition-Related Medications:   Methylprednisolone.     Nutrition-Related Labs:  7/22: No new labs.   7/21: K+ 3.1; ; Ca+ 10.5.    Diet Order: Diet Low Sodium, 2gm  Oral Supplement Order: none  Appetite/Oral Intake: fair/25-50% of meals  Factors Affecting Nutritional Intake: none identified  Food/Temple/Cultural Preferences: none reported  Food Allergies: none reported       Wound(s):       Comments    7/22: Pt with fair appetite today. No recent weight loss noted/reported. No chewing/swallowing issues reported. Labs and meds reviewed. Will continue to monitor during stay.     Anthropometrics         Last Weight: 42.9 kg (94 lb 9.2 oz) (07/21/24 2315)       BMI Classification: not applicable No height recorded.                                          Usual Weight Provided By: EMR weight history    Wt Readings from Last 5 Encounters:   07/21/24 42.9 kg (94 lb 9.2 oz)   04/15/24 40.8 kg (90 lb)   03/12/23 37.2 kg (82 lb)   09/15/22 37.6 kg (83 lb)   05/04/22 37.2 kg (82 lb)     Weight Change(s) Since Admission:  Admit Weight: 42.9 kg (94 lb 9.2 oz) (07/21/24 2315)      Patient Education    Not applicable.    Monitoring & Evaluation     Dietitian will monitor food and  beverage intake, weight, weight change, electrolyte/renal panel, glucose/endocrine profile, and gastrointestinal profile.  Nutrition Risk/Follow-Up: low (follow-up in 5-7 days)  Patients assigned 'low nutrition risk' status do not qualify for a full nutritional assessment but will be monitored and re-evaluated in a 5-7 day time period. Please consult if re-evaluation needed sooner.

## 2024-07-22 NOTE — PLAN OF CARE
07/22/24 1618   Discharge Assessment   Assessment Type Discharge Planning Assessment   Source of Information patient   People in Home alone   Do you expect to return to your current living situation? Yes   Do you have help at home or someone to help you manage your care at home? Yes   Who are your caregiver(s) and their phone number(s)? fly live a block from her and frequently check on her during the day   Prior to hospitilization cognitive status: Alert/Oriented;No Deficits   Current cognitive status: Alert/Oriented;No Deficits   Walking or Climbing Stairs Difficulty yes   Walking or Climbing Stairs ambulation difficulty, requires equipment;transferring difficulty, assistance 1 person;stair climbing difficulty, dependent   Mobility Management 2 walkers and a w/c in her car   Dressing/Bathing Difficulty yes   Dressing/Bathing bathing difficulty, assistance 1 person   Dressing/Bathing Management CNA 3 days a week goes to bath her   Home Accessibility wheelchair accessible   Equipment Currently Used at Home wheelchair;walker, rolling;oxygen   Patient currently being followed by outpatient case management? No   Do you currently have service(s) that help you manage your care at home? No   Do you take prescription medications? Yes   Do you have prescription coverage? Yes   Do you have any problems affording any of your prescribed medications? No   Is the patient taking medications as prescribed? yes   Who is going to help you get home at discharge? fly   How do you get to doctors appointments? family or friend will provide   Are you on dialysis? No   Do you take coumadin? No   Discharge Plan A Home with family   Discharge Plan B Home Health   DME Needed Upon Discharge  none   Discharge Plan discussed with: Patient   Transition of Care Barriers None   Physical Activity   On average, how many days per week do you engage in moderate to strenuous exercise (like a brisk walk)? Pt Declined   On average, how many minutes do  you engage in exercise at this level? Pt Declined   Financial Resource Strain   How hard is it for you to pay for the very basics like food, housing, medical care, and heating? Pt Declined   Housing Stability   In the last 12 months, was there a time when you were not able to pay the mortgage or rent on time? Pt Declined   At any time in the past 12 months, were you homeless or living in a shelter (including now)? Pt Declined   Transportation Needs   Has the lack of transportation kept you from medical appointments, meetings, work or from getting things needed for daily living? Patient declined   Food Insecurity   Within the past 12 months, you worried that your food would run out before you got the money to buy more. Pt Declined   Within the past 12 months, the food you bought just didn't last and you didn't have money to get more. Pt Declined   Stress   Do you feel stress - tense, restless, nervous, or anxious, or unable to sleep at night because your mind is troubled all the time - these days? Pt Declined   Social Isolation   How often do you feel lonely or isolated from those around you?  Patient declined   Alcohol Use   Q1: How often do you have a drink containing alcohol? Pt Declined   Q2: How many drinks containing alcohol do you have on a typical day when you are drinking? Pt Declined   Q3: How often do you have six or more drinks on one occasion? Pt Declined   Utilities   In the past 12 months has the electric, gas, oil, or water company threatened to shut off services in your home? Pt Declined   Health Literacy   How often do you need to have someone help you when you read instructions, pamphlets, or other written material from your doctor or pharmacy? Patient declines to respond

## 2024-07-22 NOTE — ED PROVIDER NOTES
Encounter Date: 7/21/2024  History from patient     History     Chief Complaint   Patient presents with    Shortness of Breath     Began yesterday with SOB and fever this am. Wears 4L home o2, ran out en route to hospital.      HPI  .  Betty Crochet Lejeune is 79 y.o. female who  has a past medical history of Anxiety disorder, unspecified, CMTS (Charcot-Toya-Tooth syndrome), Hypertension, Muscular dystrophy, and Smoker. arrives in ER with c/o Shortness of Breath (Began yesterday with SOB and fever this am. Wears 4L home o2, ran out en route to hospital. )      Review of patient's allergies indicates:   Allergen Reactions    Aspirin      Other reaction(s): Unable to tolerate  325 mg dose    Codeine      Other reaction(s): Irregular Heart Rate, unknown    Ezetimibe-simvastatin     Pravastatin      Other reaction(s): unknown     Past Medical History:   Diagnosis Date    Anxiety disorder, unspecified     CMTS (Charcot-Toya-Tooth syndrome)     Hypertension     Muscular dystrophy     Smoker      Past Surgical History:   Procedure Laterality Date    CHOLECYSTECTOMY      HIP REPLACEMENT ARTHROPLASTY      HYSTERECTOMY       Family History   Problem Relation Name Age of Onset    Hypertension Mother      Coronary artery disease Mother       Social History     Tobacco Use    Smoking status: Heavy Smoker     Current packs/day: 0.50     Types: Cigarettes    Smokeless tobacco: Never   Substance Use Topics    Alcohol use: Not Currently    Drug use: Never     Review of Systems   Constitutional:  Positive for fever.   HENT:  Negative for congestion, trouble swallowing and voice change.    Eyes:  Negative for visual disturbance.   Respiratory:  Positive for cough and shortness of breath.    Cardiovascular:  Negative for chest pain.   Gastrointestinal:  Negative for abdominal pain, diarrhea and vomiting.   Genitourinary:  Negative for dysuria and hematuria.   Musculoskeletal:  Positive for myalgias. Negative for back pain and gait  problem.   Skin:  Negative for color change and rash.   Neurological:  Negative for headaches.   Psychiatric/Behavioral:  Negative for behavioral problems and sleep disturbance.    All other systems reviewed and are negative.      Physical Exam     Initial Vitals [07/21/24 2056]   BP Pulse Resp Temp SpO2   (!) 200/117 104 (!) 26 97.8 °F (36.6 °C) (!) 91 %      MAP       --         Physical Exam    Nursing note and vitals reviewed.  Constitutional: She appears well-developed. She appears distressed.   HENT:   Head: Atraumatic.   Eyes: EOM are normal.   Neck: Neck supple.   Cardiovascular:  Normal rate and regular rhythm.           Pulmonary/Chest: She is in respiratory distress. She has rhonchi.   Very decreased air entry bilaterally   Abdominal: Abdomen is soft. Bowel sounds are normal.   Musculoskeletal:         General: Edema present. Normal range of motion.      Cervical back: Neck supple.     Neurological: She is alert and oriented to person, place, and time. GCS score is 15. GCS eye subscore is 4. GCS verbal subscore is 5. GCS motor subscore is 6.   Skin: Skin is warm and dry.   Psychiatric: She has a normal mood and affect.         ED Course   Procedures  Orders Placed This Encounter   Procedures    Blood culture #1 **CANNOT BE ORDERED STAT**    Blood culture #2 **CANNOT BE ORDERED STAT**    X-ray Chest AP Portable    CTA Chest Non-Coronary (PE Studies)    Comprehensive metabolic panel    CBC auto differential    Troponin I    Brain natriuretic peptide    Lipid Panel    CBC with Differential    COVID/RSV/FLU A&B PCR    Lactic acid, plasma    D dimer, quantitative    Diet Low Sodium, 2gm    Vital signs    Cardiac Monitoring - Adult    Oxygen Continuous    Pulse Oximetry Continuous    Inhalation Treatment Once    EKG 12-LEAD    Insert saline lock    Place in Observation     Medications   methylPREDNISolone sodium succinate injection 60 mg (60 mg Intravenous Given 7/21/24 4301)   cefTRIAXone (Rocephin) 1 g in D5W  100 mL IVPB (MB+) (1 g Intravenous New Bag 7/21/24 2332)   budesonide nebulizer solution 0.5 mg (0.5 mg Nebulization Given 7/21/24 2120)   albuterol-ipratropium 2.5 mg-0.5 mg/3 mL nebulizer solution 3 mL (3 mLs Nebulization Given 7/21/24 2120)   potassium bicarbonate disintegrating tablet 50 mEq (50 mEq Oral Given 7/21/24 2204)   iopamidoL (ISOVUE-370) injection 100 mL (100 mLs Intravenous Given 7/21/24 2211)     Admission on 07/21/2024   Component Date Value Ref Range Status    Sodium 07/21/2024 141  136 - 145 mmol/L Final    Potassium 07/21/2024 3.1 (L)  3.5 - 5.1 mmol/L Final    Chloride 07/21/2024 96 (L)  98 - 107 mmol/L Final    CO2 07/21/2024 29  23 - 31 mmol/L Final    Glucose 07/21/2024 164 (H)  82 - 115 mg/dL Final    Blood Urea Nitrogen 07/21/2024 16.0  9.8 - 20.1 mg/dL Final    Creatinine 07/21/2024 0.50 (L)  0.55 - 1.02 mg/dL Final    Calcium 07/21/2024 10.5 (H)  8.4 - 10.2 mg/dL Final    Protein Total 07/21/2024 7.8 (H)  5.8 - 7.6 gm/dL Final    Albumin 07/21/2024 4.2  3.4 - 4.8 g/dL Final    Globulin 07/21/2024 3.6 (H)  2.4 - 3.5 gm/dL Final    Albumin/Globulin Ratio 07/21/2024 1.2  1.1 - 2.0 ratio Final    Bilirubin Total 07/21/2024 0.7  <=1.5 mg/dL Final    ALP 07/21/2024 95  40 - 150 unit/L Final    ALT 07/21/2024 18  0 - 55 unit/L Final    AST 07/21/2024 21  5 - 34 unit/L Final    eGFR 07/21/2024 >60  mL/min/1.73/m2 Final    Anion Gap 07/21/2024 16.0  mEq/L Final    BUN/Creatinine Ratio 07/21/2024 32   Final    Troponin-I 07/21/2024 0.017  0.000 - 0.045 ng/mL Final    Natriuretic Peptide 07/21/2024 141.6 (H)  <=100.0 pg/mL Final    Cholesterol Total 07/21/2024 269 (H)  <=200 mg/dL Final    HDL Cholesterol 07/21/2024 82 (H)  35 - 60 mg/dL Final    Triglyceride 07/21/2024 92  37 - 140 mg/dL Final    Cholesterol/HDL Ratio 07/21/2024 3  0 - 5 Final    Very Low Density Lipoprotein 07/21/2024 18   Final    LDL Cholesterol 07/21/2024 169.00 (H)  50.00 - 140.00 mg/dL Final    WBC 07/21/2024 10.70  4.50 -  11.50 x10(3)/mcL Final    RBC 07/21/2024 4.33  4.20 - 5.40 x10(6)/mcL Final    Hgb 07/21/2024 12.2  12.0 - 16.0 g/dL Final    Hct 07/21/2024 39.5  37.0 - 47.0 % Final    MCV 07/21/2024 91.2  80.0 - 94.0 fL Final    MCH 07/21/2024 28.2  27.0 - 31.0 pg Final    MCHC 07/21/2024 30.9 (L)  33.0 - 36.0 g/dL Final    RDW 07/21/2024 12.9  11.5 - 17.0 % Final    Platelet 07/21/2024 248  130 - 400 x10(3)/mcL Final    MPV 07/21/2024 9.7  7.4 - 10.4 fL Final    Neut % 07/21/2024 80.6  % Final    Lymph % 07/21/2024 12.5  % Final    Mono % 07/21/2024 4.2  % Final    Eos % 07/21/2024 2.1  % Final    Basophil % 07/21/2024 0.3  % Final    Lymph # 07/21/2024 1.34  0.6 - 4.6 x10(3)/mcL Final    Neut # 07/21/2024 8.63  2.1 - 9.2 x10(3)/mcL Final    Mono # 07/21/2024 0.45  0.1 - 1.3 x10(3)/mcL Final    Eos # 07/21/2024 0.22  0 - 0.9 x10(3)/mcL Final    Baso # 07/21/2024 0.03  <=0.2 x10(3)/mcL Final    IG# 07/21/2024 0.03  0 - 0.04 x10(3)/mcL Final    IG% 07/21/2024 0.3  % Final    Influenza A PCR 07/21/2024 Not Detected  Not Detected Final    Influenza B PCR 07/21/2024 Not Detected  Not Detected Final    Respiratory Syncytial Virus PCR 07/21/2024 Not Detected  Not Detected Final    SARS-CoV-2 PCR 07/21/2024 Not Detected  Not Detected, Negative Final    Lactic Acid Level 07/21/2024 1.5  0.5 - 2.2 mmol/L Final    D-Dimer 07/21/2024 1.17 (H)  0.00 - 0.50 ug/mL FEU Final       Labs Reviewed   COMPREHENSIVE METABOLIC PANEL - Abnormal       Result Value    Sodium 141      Potassium 3.1 (*)     Chloride 96 (*)     CO2 29      Glucose 164 (*)     Blood Urea Nitrogen 16.0      Creatinine 0.50 (*)     Calcium 10.5 (*)     Protein Total 7.8 (*)     Albumin 4.2      Globulin 3.6 (*)     Albumin/Globulin Ratio 1.2      Bilirubin Total 0.7      ALP 95      ALT 18      AST 21      eGFR >60      Anion Gap 16.0      BUN/Creatinine Ratio 32     B-TYPE NATRIURETIC PEPTIDE - Abnormal    Natriuretic Peptide 141.6 (*)    LIPID PANEL - Abnormal    Cholesterol  Total 269 (*)     HDL Cholesterol 82 (*)     Triglyceride 92      Cholesterol/HDL Ratio 3      Very Low Density Lipoprotein 18      LDL Cholesterol 169.00 (*)    CBC WITH DIFFERENTIAL - Abnormal    WBC 10.70      RBC 4.33      Hgb 12.2      Hct 39.5      MCV 91.2      MCH 28.2      MCHC 30.9 (*)     RDW 12.9      Platelet 248      MPV 9.7      Neut % 80.6      Lymph % 12.5      Mono % 4.2      Eos % 2.1      Basophil % 0.3      Lymph # 1.34      Neut # 8.63      Mono # 0.45      Eos # 0.22      Baso # 0.03      IG# 0.03      IG% 0.3     D DIMER, QUANTITATIVE - Abnormal    D-Dimer 1.17 (*)    TROPONIN I - Normal    Troponin-I 0.017     COVID/RSV/FLU A&B PCR - Normal    Influenza A PCR Not Detected      Influenza B PCR Not Detected      Respiratory Syncytial Virus PCR Not Detected      SARS-CoV-2 PCR Not Detected      Narrative:     The Xpert Xpress SARS-CoV-2/FLU/RSV plus is a rapid, multiplexed real-time PCR test intended for the simultaneous qualitative detection and differentiation of SARS-CoV-2, Influenza A, Influenza B, and respiratory syncytial virus (RSV) viral RNA in either nasopharyngeal swab or nasal swab specimens.         LACTIC ACID, PLASMA - Normal    Lactic Acid Level 1.5     BLOOD CULTURE OLG   BLOOD CULTURE OLG   CBC W/ AUTO DIFFERENTIAL    Narrative:     The following orders were created for panel order CBC auto differential.  Procedure                               Abnormality         Status                     ---------                               -----------         ------                     CBC with Differential[[id number]]       Abnormal            Final result                 Please view results for these tests on the individual orders.        ECG Results              EKG 12-LEAD (Preliminary result)  Result time 07/21/24 21:33:11      Wet Read by Elie Blas MD (07/21/24 21:33:11, [facility] - Emergency Dept, Emergency Medicine)    EKG: Independently reviewed and / or  Interpreted by Elie Blas MD. independently as Normal Sinus Rhythm, Rate 118, Sinus Tachycardia, Normal Axis, RBBB, PAC's noted, No STEMI                                   Imaging Results              CTA Chest Non-Coronary (PE Studies) (Final result)  Result time 07/21/24 22:23:17      Final result by Baltazar Shipman MD (07/21/24 22:23:17)                   Impression:      No pulmonary embolus is identified.    Marked kyphosis of the thoracic spine      Electronically signed by: Baltazar Shipman MD  Date:    07/21/2024  Time:    22:23               Narrative:    EXAMINATION:  CTA CHEST NON CORONARY (PE STUDIES)    CLINICAL HISTORY:  Pulmonary embolism (PE) suspected, positive D-dimer;    TECHNIQUE:  Low dose axial images, sagittal and coronal reformations were obtained from the thoracic inlet to the lung bases following the IV administration of 100 mL of Omnipaque 350.  Contrast timing was optimized to evaluate the pulmonary arteries.  MIP images were performed.    Automatic exposure control (AEC) was utilized for dose reduction.    Dose: 103 mGycm    COMPARISON:  None    FINDINGS:  Patient is markedly kyphotic.  The mediastinum reveals no significant adenopathy.  The thoracic aorta appears intact.  The visualized portion of the upper abdomen appears normal.  There is diffuse ground-glass appearance in the lungs bilaterally with extensive centrilobular emphysema.    There are no filling defects seen within the pulmonary arteries to indicate embolus.                                       X-ray Chest AP Portable (Final result)  Result time 07/21/24 21:28:31      Final result by Baltazar Shipman MD (07/21/24 21:28:31)                   Impression:      No acute disease is seen      Electronically signed by: Baltazar Shipman MD  Date:    07/21/2024  Time:    21:28               Narrative:    EXAMINATION:  XR CHEST AP PORTABLE    CLINICAL HISTORY:  Chest Pain;    TECHNIQUE:  Single frontal view of the chest was  performed.    COMPARISON:  02/20/2024    FINDINGS:  There is scoliosis.  An infiltrate is not seen.  Heart size is within normal limits.                                       Medications   methylPREDNISolone sodium succinate injection 60 mg (60 mg Intravenous Given 7/21/24 2331)   cefTRIAXone (Rocephin) 1 g in D5W 100 mL IVPB (MB+) (1 g Intravenous New Bag 7/21/24 2332)   budesonide nebulizer solution 0.5 mg (0.5 mg Nebulization Given 7/21/24 2120)   albuterol-ipratropium 2.5 mg-0.5 mg/3 mL nebulizer solution 3 mL (3 mLs Nebulization Given 7/21/24 2120)   potassium bicarbonate disintegrating tablet 50 mEq (50 mEq Oral Given 7/21/24 2204)   iopamidoL (ISOVUE-370) injection 100 mL (100 mLs Intravenous Given 7/21/24 2211)     Medical Decision Making    Betty Crochet Lejeune is 79 y.o. female who  has a past medical history of Anxiety disorder, unspecified, CMTS (Charcot-Toya-Tooth syndrome), Hypertension, Muscular dystrophy, and Smoker. arrives in ER with c/o Shortness of Breath (Began yesterday with SOB and fever this am. Wears 4L home o2, ran out en route to hospital. )    Patient says that she started having shortness of breath yesterday, got worse today, she thinks she might had fever also this morning, having body aches all over, so she decided to come to the hospital, usually she uses 4 L nasal cannula at home, on the way to the hospital she had done out of her oxygen so on arrival she has where it tachypneic and tachycardic.    I will give her a neb treatment with the budesonide although her chart says that she is allergic to it, but she is using it at home without any problem, and also I will give her DuoNeb will do the whole workup look for pneumonia, COPD exacerbation, heart failure.  Patient has 2 to 3+ pitting edema bilateral lower extremities, significant swelling on both the feet, she is tachycardic, she has body aches all over, I will do flu COVID and RSV test on her,    Her EKG does not show any acute  changes of MI, I will do the work for NSTEMI, chest x-ray to rule out pneumonia, and decide further.    Amount and/or Complexity of Data Reviewed  Labs: ordered.  Radiology: ordered.    Risk  Prescription drug management.               ED Course as of 07/21/24 2347   Sun Jul 21, 2024 2159 Patient continues to be tachycardic, her O2 sat has come up, I decided to add a D-dimer as she was tachycardic with right bundle-branch block, we are going to do a CT PE protocol on her to rule out pulmonary embolism and then decide about disposition.  I will replace her potassium. [GQ]   2316 CT scan is negative for pulmonary embolism, she does not have any pneumonia, just centrilobular emphysema with some ground-glass opacities, patient is feeling better on oxygen, she does not have oxygen for transport as she ran out of it on the way to the hospital, I will talk to Dr. York and admit her in the hospital for overnight observation, give her steroids, neb treatments, antibiotic for COPD exacerbation and let Dr. De La Torre evaluate and decide further tomorrow morning. [GQ]   2340 I talked to Dr. York and she is okay with admission. [GQ]   2346 Patient is feeling better, her O2 sat is 93-94% on 4 L nasal cannula, she is still has tachycardia, blood pressure is slightly elevated, I will replace her potassium, Dr. De La Torre will see her tomorrow morning and decide about further treatment and disposition. [GQ]      ED Course User Index  [GQ] Elie Blas MD                           Clinical Impression:  Final diagnoses:  [R07.9] Chest pain  [J44.1] COPD with acute exacerbation (Primary)          ED Disposition Condition    Observation Stable                Elie Blas MD  07/21/24 9345

## 2024-07-22 NOTE — PLAN OF CARE
Problem: Adult Inpatient Plan of Care  Goal: Plan of Care Review  Outcome: Progressing  Goal: Patient-Specific Goal (Individualized)  Outcome: Progressing  Goal: Absence of Hospital-Acquired Illness or Injury  Outcome: Progressing  Goal: Optimal Comfort and Wellbeing  Outcome: Progressing  Goal: Readiness for Transition of Care  Outcome: Progressing     Problem: Hypertension Acute  Goal: Blood Pressure Within Desired Range  Outcome: Progressing     Problem: COPD (Chronic Obstructive Pulmonary Disease)  Goal: Optimal Chronic Illness Coping  Outcome: Progressing  Goal: Optimal Level of Functional [place]  Outcome: Progressing  Goal: Absence of Infection Signs and Symptoms  Outcome: Progressing  Goal: Improved Oral Intake  Outcome: Progressing  Goal: Effective Oxygenation and Ventilation  Outcome: Progressing     Problem: Anxiety  Goal: Anxiety Reduction or Resolution  Outcome: Progressing     Problem: Gas Exchange Impaired  Goal: Optimal Gas Exchange  Outcome: Progressing

## 2024-07-22 NOTE — PLAN OF CARE
Problem: Adult Inpatient Plan of Care  Goal: Plan of Care Review  Outcome: Progressing  Goal: Patient-Specific Goal (Individualized)  Outcome: Progressing  Goal: Absence of Hospital-Acquired Illness or Injury  Outcome: Progressing  Goal: Optimal Comfort and Wellbeing  Outcome: Progressing  Goal: Readiness for Transition of Care  Outcome: Progressing     Problem: Hypertension Acute  Goal: Blood Pressure Within Desired Range  Outcome: Progressing     Problem: COPD (Chronic Obstructive Pulmonary Disease)  Goal: Optimal Chronic Illness Coping  Outcome: Progressing  Goal: Optimal Level of Functional [place]  Outcome: Progressing  Goal: Absence of Infection Signs and Symptoms  Outcome: Progressing  Goal: Improved Oral Intake  Outcome: Progressing  Goal: Effective Oxygenation and Ventilation  Outcome: Progressing     Problem: Anxiety  Goal: Anxiety Reduction or Resolution  Outcome: Progressing     Problem: Gas Exchange Impaired  Goal: Optimal Gas Exchange  Outcome: Progressing

## 2024-07-22 NOTE — ED NOTES
Pt and family unable to recall home meds. Daughter states they will bring home meds tomorrow morning.

## 2024-07-22 NOTE — PLAN OF CARE
Problem: Adult Inpatient Plan of Care  Goal: Plan of Care Review  7/22/2024 0143 by Robles Valentine RN  Outcome: Progressing  7/22/2024 0137 by Robles Valentine RN  Outcome: Progressing  Goal: Patient-Specific Goal (Individualized)  7/22/2024 0143 by Robles Valentine RN  Outcome: Progressing  7/22/2024 0137 by Robles Valentine, RN  Outcome: Progressing  Goal: Absence of Hospital-Acquired Illness or Injury  7/22/2024 0143 by Robles Valentine, RN  Outcome: Progressing  7/22/2024 0137 by Robles Valentine, FRANK  Outcome: Progressing  Goal: Optimal Comfort and Wellbeing  7/22/2024 0143 by Robles Valentine RN  Outcome: Progressing  7/22/2024 0137 by Robles Valentine, FRANK  Outcome: Progressing  Goal: Readiness for Transition of Care  7/22/2024 0143 by Robles Valentine, RN  Outcome: Progressing  7/22/2024 0137 by Robles Valentine, FRANK  Outcome: Progressing     Problem: Hypertension Acute  Goal: Blood Pressure Within Desired Range  7/22/2024 0143 by Robles Valentine, RN  Outcome: Progressing  7/22/2024 0137 by Robles Valentine, FRANK  Outcome: Progressing     Problem: COPD (Chronic Obstructive Pulmonary Disease)  Goal: Optimal Chronic Illness Coping  7/22/2024 0143 by Robles Valentine, RN  Outcome: Progressing  7/22/2024 0137 by Robles Valentine, RN  Outcome: Progressing  Goal: Optimal Level of Functional [place]  7/22/2024 0143 by Robles Valentine, RN  Outcome: Progressing  7/22/2024 0137 by Robles Valentine, RN  Outcome: Progressing  Goal: Absence of Infection Signs and Symptoms  7/22/2024 0143 by Robles Valentine, RN  Outcome: Progressing  7/22/2024 0137 by Robles Valentine, RN  Outcome: Progressing  Goal: Improved Oral Intake  7/22/2024 0143 by Robles Valentine, RN  Outcome: Progressing  7/22/2024 0137 by Robles Valentine, RN  Outcome: Progressing  Goal: Effective Oxygenation and Ventilation  7/22/2024 0143 by Robles Valentine, RN  Outcome: Progressing  7/22/2024 0137 by Robles Valentine, RN  Outcome: Progressing     Problem: Anxiety  Goal: Anxiety Reduction or Resolution  7/22/2024 0143 by Serge,  FRANK Arboleda  Outcome: Progressing  7/22/2024 0137 by Robles Valentine RN  Outcome: Progressing     Problem: Gas Exchange Impaired  Goal: Optimal Gas Exchange  7/22/2024 0143 by Robles Valentine RN  Outcome: Progressing  7/22/2024 0137 by Robles Valentine RN  Outcome: Progressing

## 2024-07-23 PROBLEM — F17.200 TOBACCO DEPENDENCY: Status: ACTIVE | Noted: 2024-07-23

## 2024-07-23 LAB
ALBUMIN SERPL-MCNC: 3.7 G/DL (ref 3.4–4.8)
ALBUMIN/GLOB SERPL: 1.2 RATIO (ref 1.1–2)
ALP SERPL-CCNC: 75 UNIT/L (ref 40–150)
ALT SERPL-CCNC: 16 UNIT/L (ref 0–55)
ANION GAP SERPL CALC-SCNC: 10 MEQ/L
APICAL FOUR CHAMBER EJECTION FRACTION: 74 %
APICAL TWO CHAMBER EJECTION FRACTION: 70 %
AST SERPL-CCNC: 17 UNIT/L (ref 5–34)
AV PEAK GRADIENT: 11 MMHG
AV VALVE AREA BY VELOCITY RATIO: 1.42 CM²
AV VELOCITY RATIO: 0.82
B PERT.PT PRMT NPH QL NAA+NON-PROBE: NOT DETECTED
BASOPHILS # BLD AUTO: 0 X10(3)/MCL
BASOPHILS NFR BLD AUTO: 0 %
BILIRUB SERPL-MCNC: 0.3 MG/DL
BUN SERPL-MCNC: 19 MG/DL (ref 9.8–20.1)
C PNEUM DNA NPH QL NAA+NON-PROBE: NOT DETECTED
CALCIUM SERPL-MCNC: 9.7 MG/DL (ref 8.4–10.2)
CHLORIDE SERPL-SCNC: 100 MMOL/L (ref 98–107)
CO2 SERPL-SCNC: 28 MMOL/L (ref 23–31)
CREAT SERPL-MCNC: 0.5 MG/DL (ref 0.55–1.02)
CREAT/UREA NIT SERPL: 38
CV ECHO LV RWT: 0.93 CM
DOP CALC AO PEAK VEL: 1.69 M/S
DOP CALC LVOT AREA: 1.7 CM2
DOP CALC LVOT DIAMETER: 1.49 CM
DOP CALC LVOT PEAK VEL: 1.38 M/S
DOP CALC MV VTI: 34.7 CM
E WAVE DECELERATION TIME: 411.91 MSEC
E/A RATIO: 0.8
ECHO LV POSTERIOR WALL: 1.02 CM (ref 0.6–1.1)
EOSINOPHIL # BLD AUTO: 0 X10(3)/MCL (ref 0–0.9)
EOSINOPHIL NFR BLD AUTO: 0 %
ERYTHROCYTE [DISTWIDTH] IN BLOOD BY AUTOMATED COUNT: 13.3 % (ref 11.5–17)
FRACTIONAL SHORTENING: 37 % (ref 28–44)
GFR SERPLBLD CREATININE-BSD FMLA CKD-EPI: >60 ML/MIN/1.73/M2
GLOBULIN SER-MCNC: 3.2 GM/DL (ref 2.4–3.5)
GLUCOSE SERPL-MCNC: 144 MG/DL (ref 82–115)
HADV DNA NPH QL NAA+NON-PROBE: NOT DETECTED
HCOV 229E RNA NPH QL NAA+NON-PROBE: NOT DETECTED
HCOV HKU1 RNA NPH QL NAA+NON-PROBE: NOT DETECTED
HCOV NL63 RNA NPH QL NAA+NON-PROBE: NOT DETECTED
HCOV OC43 RNA NPH QL NAA+NON-PROBE: NOT DETECTED
HCT VFR BLD AUTO: 34.5 % (ref 37–47)
HGB BLD-MCNC: 11.2 G/DL (ref 12–16)
HMPV RNA NPH QL NAA+NON-PROBE: NOT DETECTED
HPIV1 RNA NPH QL NAA+NON-PROBE: NOT DETECTED
HPIV2 RNA NPH QL NAA+NON-PROBE: NOT DETECTED
HPIV3 RNA NPH QL NAA+NON-PROBE: NOT DETECTED
HPIV4 RNA NPH QL NAA+NON-PROBE: NOT DETECTED
IMM GRANULOCYTES # BLD AUTO: 0.06 X10(3)/MCL (ref 0–0.04)
IMM GRANULOCYTES NFR BLD AUTO: 0.5 %
INTERVENTRICULAR SEPTUM: 1.06 CM (ref 0.6–1.1)
LEFT ATRIUM SIZE: 0 CM
LEFT INTERNAL DIMENSION IN SYSTOLE: 1.39 CM (ref 2.1–4)
LEFT VENTRICLE DIASTOLIC VOLUME INDEX: 11.78 ML/M2
LEFT VENTRICLE DIASTOLIC VOLUME: 16.02 ML
LEFT VENTRICLE END DIASTOLIC VOLUME APICAL 2 CHAMBER: 23.39 ML
LEFT VENTRICLE END DIASTOLIC VOLUME APICAL 4 CHAMBER: 32.27 ML
LEFT VENTRICLE MASS INDEX: 42 G/M2
LEFT VENTRICLE SYSTOLIC VOLUME INDEX: 3.6 ML/M2
LEFT VENTRICLE SYSTOLIC VOLUME: 4.92 ML
LEFT VENTRICULAR INTERNAL DIMENSION IN DIASTOLE: 2.19 CM (ref 3.5–6)
LEFT VENTRICULAR MASS: 56.64 G
LVED V (TEICH): 16.02 ML
LVES V (TEICH): 4.92 ML
LYMPHOCYTES # BLD AUTO: 0.61 X10(3)/MCL (ref 0.6–4.6)
LYMPHOCYTES NFR BLD AUTO: 5.2 %
M PNEUMO DNA NPH QL NAA+NON-PROBE: NOT DETECTED
MAGNESIUM SERPL-MCNC: 2.1 MG/DL (ref 1.6–2.6)
MCH RBC QN AUTO: 28.6 PG (ref 27–31)
MCHC RBC AUTO-ENTMCNC: 32.5 G/DL (ref 33–36)
MCV RBC AUTO: 88 FL (ref 80–94)
MONOCYTES # BLD AUTO: 0.29 X10(3)/MCL (ref 0.1–1.3)
MONOCYTES NFR BLD AUTO: 2.5 %
MV MEAN GRADIENT: 4 MMHG
MV PEAK A VEL: 1.33 M/S
MV PEAK E VEL: 1.06 M/S
MV PEAK GRADIENT: 9 MMHG
MV STENOSIS PRESSURE HALF TIME: 119.45 MS
MV VALVE AREA P 1/2 METHOD: 1.84 CM2
NEUTROPHILS # BLD AUTO: 10.73 X10(3)/MCL (ref 2.1–9.2)
NEUTROPHILS NFR BLD AUTO: 91.8 %
OHS CV RV/LV RATIO: 0 CM
OHS LV EJECTION FRACTION SIMPSONS BIPLANE MOD: 70 %
OHS QRS DURATION: 110 MS
OHS QTC CALCULATION: 462 MS
PHOSPHATE SERPL-MCNC: 3.4 MG/DL (ref 2.3–4.7)
PISA MRMAX VEL: 3.86 M/S
PISA TR MAX VEL: 3.03 M/S
PLATELET # BLD AUTO: 234 X10(3)/MCL (ref 130–400)
PMV BLD AUTO: 10.1 FL (ref 7.4–10.4)
POTASSIUM SERPL-SCNC: 3.9 MMOL/L (ref 3.5–5.1)
PROT SERPL-MCNC: 6.9 GM/DL (ref 5.8–7.6)
PV PEAK GRADIENT: 8 MMHG
PV PEAK VELOCITY: 1.44 M/S
RBC # BLD AUTO: 3.92 X10(6)/MCL (ref 4.2–5.4)
RIGHT VENTRICULAR END-DIASTOLIC DIMENSION: 0 CM
RSV RNA NPH QL NAA+NON-PROBE: NOT DETECTED
RV+EV RNA NPH QL NAA+NON-PROBE: NOT DETECTED
SODIUM SERPL-SCNC: 138 MMOL/L (ref 136–145)
TR MAX PG: 37 MMHG
WBC # BLD AUTO: 11.69 X10(3)/MCL (ref 4.5–11.5)
Z-SCORE OF LEFT VENTRICULAR DIMENSION IN END DIASTOLE: -6.79
Z-SCORE OF LEFT VENTRICULAR DIMENSION IN END SYSTOLE: -5.19

## 2024-07-23 PROCEDURE — A4216 STERILE WATER/SALINE, 10 ML: HCPCS | Performed by: INTERNAL MEDICINE

## 2024-07-23 PROCEDURE — 25000242 PHARM REV CODE 250 ALT 637 W/ HCPCS: Performed by: INTERNAL MEDICINE

## 2024-07-23 PROCEDURE — 21400001 HC TELEMETRY ROOM

## 2024-07-23 PROCEDURE — 84100 ASSAY OF PHOSPHORUS: CPT | Performed by: INTERNAL MEDICINE

## 2024-07-23 PROCEDURE — 63600175 PHARM REV CODE 636 W HCPCS: Performed by: INTERNAL MEDICINE

## 2024-07-23 PROCEDURE — 83735 ASSAY OF MAGNESIUM: CPT | Performed by: INTERNAL MEDICINE

## 2024-07-23 PROCEDURE — 85025 COMPLETE CBC W/AUTO DIFF WBC: CPT | Performed by: INTERNAL MEDICINE

## 2024-07-23 PROCEDURE — 94640 AIRWAY INHALATION TREATMENT: CPT

## 2024-07-23 PROCEDURE — 36415 COLL VENOUS BLD VENIPUNCTURE: CPT | Performed by: INTERNAL MEDICINE

## 2024-07-23 PROCEDURE — 25000003 PHARM REV CODE 250: Performed by: INTERNAL MEDICINE

## 2024-07-23 PROCEDURE — 99900035 HC TECH TIME PER 15 MIN (STAT)

## 2024-07-23 PROCEDURE — 94761 N-INVAS EAR/PLS OXIMETRY MLT: CPT

## 2024-07-23 PROCEDURE — 27000221 HC OXYGEN, UP TO 24 HOURS

## 2024-07-23 PROCEDURE — 80053 COMPREHEN METABOLIC PANEL: CPT | Performed by: INTERNAL MEDICINE

## 2024-07-23 RX ORDER — IBUPROFEN 200 MG
1 TABLET ORAL DAILY
Status: DISCONTINUED | OUTPATIENT
Start: 2024-07-23 | End: 2024-07-24 | Stop reason: HOSPADM

## 2024-07-23 RX ORDER — ENOXAPARIN SODIUM 100 MG/ML
40 INJECTION SUBCUTANEOUS EVERY 24 HOURS
Status: DISCONTINUED | OUTPATIENT
Start: 2024-07-23 | End: 2024-07-24 | Stop reason: HOSPADM

## 2024-07-23 RX ADMIN — CEFTRIAXONE SODIUM 1 G: 1 INJECTION, POWDER, FOR SOLUTION INTRAMUSCULAR; INTRAVENOUS at 12:07

## 2024-07-23 RX ADMIN — PANTOPRAZOLE SODIUM 40 MG: 40 TABLET, DELAYED RELEASE ORAL at 08:07

## 2024-07-23 RX ADMIN — METHYLPREDNISOLONE SODIUM SUCCINATE 60 MG: 40 INJECTION, POWDER, FOR SOLUTION INTRAMUSCULAR; INTRAVENOUS at 08:07

## 2024-07-23 RX ADMIN — GABAPENTIN 300 MG: 300 CAPSULE ORAL at 08:07

## 2024-07-23 RX ADMIN — SODIUM CHLORIDE, PRESERVATIVE FREE 10 ML: 5 INJECTION INTRAVENOUS at 12:07

## 2024-07-23 RX ADMIN — DORZOLAMIDE HYDROCHLORIDE AND TIMOLOL MALEATE 1 DROP: 20; 5 SOLUTION OPHTHALMIC at 08:07

## 2024-07-23 RX ADMIN — ENOXAPARIN SODIUM 40 MG: 40 INJECTION SUBCUTANEOUS at 11:07

## 2024-07-23 RX ADMIN — DORZOLAMIDE HYDROCHLORIDE AND TIMOLOL MALEATE 1 DROP: 20; 5 SOLUTION OPHTHALMIC at 10:07

## 2024-07-23 RX ADMIN — SODIUM CHLORIDE, PRESERVATIVE FREE 10 ML: 5 INJECTION INTRAVENOUS at 10:07

## 2024-07-23 RX ADMIN — VALSARTAN 160 MG: 80 TABLET, FILM COATED ORAL at 08:07

## 2024-07-23 RX ADMIN — IPRATROPIUM BROMIDE AND ALBUTEROL SULFATE 3 ML: .5; 3 SOLUTION RESPIRATORY (INHALATION) at 01:07

## 2024-07-23 RX ADMIN — METHYLPREDNISOLONE SODIUM SUCCINATE 60 MG: 40 INJECTION, POWDER, FOR SOLUTION INTRAMUSCULAR; INTRAVENOUS at 09:07

## 2024-07-23 RX ADMIN — GABAPENTIN 300 MG: 300 CAPSULE ORAL at 03:07

## 2024-07-23 RX ADMIN — ACETAMINOPHEN 650 MG: 325 TABLET, FILM COATED ORAL at 08:07

## 2024-07-23 RX ADMIN — IPRATROPIUM BROMIDE AND ALBUTEROL SULFATE 3 ML: .5; 3 SOLUTION RESPIRATORY (INHALATION) at 07:07

## 2024-07-23 RX ADMIN — BUDESONIDE 0.5 MG: 0.5 INHALANT RESPIRATORY (INHALATION) at 07:07

## 2024-07-23 RX ADMIN — SODIUM CHLORIDE, PRESERVATIVE FREE 10 ML: 5 INJECTION INTRAVENOUS at 03:07

## 2024-07-23 NOTE — ASSESSMENT & PLAN NOTE
Continue blood pressure medications Chronic, controlled. Latest blood pressure and vitals reviewed-     Temp:  [97.5 °F (36.4 °C)-98.3 °F (36.8 °C)]   Pulse:  []   Resp:  [18-22]   BP: (122-168)/(62-87)   SpO2:  [90 %-98 %] .   Home meds for hypertension were reviewed and noted below.   Hypertension Medications               valsartan (DIOVAN) 160 MG tablet Take 160 mg by mouth once daily.            While in the hospital, will manage blood pressure as follows; Continue home antihypertensive regimen    Will utilize p.r.n. blood pressure medication only if patient's blood pressure greater than 160/100 and she develops symptoms such as worsening chest pain or shortness of breath.

## 2024-07-23 NOTE — HPI
Patient was a 79-year-old white female with muscular dystrophy who presents with COPD exacerbation in terms of shortness of breaths.  She was a former smoker for many years.  She stated she just could not take it anymore.  She states that she does not take her inhalers at home now sometimes because they make her feel jittery and short of breath.  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      She denies any productive cough fevers chills nausea or signs of toxicity or infection.  We are admitting her to the hospital for a COPD exacerbation treatment.

## 2024-07-23 NOTE — H&P
[facility]  [facility] Medicine  History & Physical    Patient Name: Betty Crochet Lejeune  MRN: [id number]  Patient Class: IP- Inpatient  Admission Date: 7/21/2024  Attending Physician: Panchito De La Torre III, *   Primary Care Provider: Panchito De La Torre III, MD         Patient information was obtained from patient and ER records.     Subjective:     Principal Problem:COPD with acute exacerbation    Chief Complaint:   Chief Complaint   Patient presents with    Shortness of Breath     Began yesterday with SOB and fever this am. Wears 4L home o2, ran out en route to hospital.         HPI: Patient was a 79-year-old white female with muscular dystrophy who presents with COPD exacerbation in terms of shortness of breaths.  She was a former smoker for many years.  She stated she just could not take it anymore.  She states that she does not take her inhalers at home now sometimes because they make her feel jittery and short of breath.  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      She denies any productive cough fevers chills nausea or signs of toxicity or infection.  We are admitting her to the hospital for a COPD exacerbation treatment.    Past Medical History:   Diagnosis Date    Anxiety disorder, unspecified     CMTS (Charcot-Toya-Tooth syndrome)     COPD (chronic obstructive pulmonary disease)     Hypertension     Muscular dystrophy     Smoker        Past Surgical History:   Procedure Laterality Date    CHOLECYSTECTOMY      HIP REPLACEMENT ARTHROPLASTY      HYSTERECTOMY         Review of patient's allergies indicates:   Allergen Reactions    Aspirin      Other reaction(s): Unable to tolerate  325 mg dose    Codeine      Other reaction(s): Irregular Heart Rate, unknown    Ezetimibe-simvastatin     Pravastatin      Other reaction(s): unknown       No current facility-administered medications on file prior to encounter.     Current Outpatient  Medications on File Prior to Encounter   Medication Sig    acetaminophen (TYLENOL) 500 mg Cap Take 1,000 mg by mouth nightly.    aspirin (ECOTRIN) 81 MG EC tablet Take 81 mg by mouth every evening.    bimal/D3/mag11/zinc//julio/bor (CALTRATE 600-D PLUS MINERALS ORAL) Take 1 tablet by mouth 2 (two) times a day.    dorzolamide-timolol 2-0.5% (COSOPT) 22.3-6.8 mg/mL ophthalmic solution Place 1 drop into both eyes 2 (two) times daily.    gabapentin (NEURONTIN) 300 MG capsule Take 300 mg by mouth 4 (four) times daily.    omeprazole (PRILOSEC) 20 MG capsule Take 20 mg by mouth once daily.    valsartan (DIOVAN) 160 MG tablet Take 160 mg by mouth once daily.    [DISCONTINUED] diphenhydrAMINE (SOMINEX) 25 mg tablet Take 25 mg by mouth nightly.    albuterol-ipratropium (DUO-NEB) 2.5 mg-0.5 mg/3 mL nebulizer solution Take 3 mLs by nebulization every 6 (six) hours as needed for Wheezing. Rescue     Family History       Problem Relation (Age of Onset)    Coronary artery disease Mother    Hypertension Mother          Tobacco Use    Smoking status: Heavy Smoker     Current packs/day: 0.50     Types: Cigarettes    Smokeless tobacco: Never   Substance and Sexual Activity    Alcohol use: Not Currently    Drug use: Never    Sexual activity: Not on file     Review of Systems   Constitutional:  Positive for activity change. Negative for fever.   HENT:  Negative for congestion.    Respiratory:  Positive for cough and shortness of breath.      Objective:     Vital Signs (Most Recent):  Temp: 97.5 °F (36.4 °C) (07/22/24 1915)  Pulse: 96 (07/22/24 1915)  Resp: 20 (07/22/24 1908)  BP: (!) 146/81 (07/22/24 1915)  SpO2: 98 % (07/22/24 1915) Vital Signs (24h Range):  Temp:  [97.5 °F (36.4 °C)-98.3 °F (36.8 °C)] 97.5 °F (36.4 °C)  Pulse:  [] 96  Resp:  [18-22] 20  SpO2:  [90 %-98 %] 98 %  BP: (122-168)/(62-87) 146/81     Weight: 42.9 kg (94 lb 9.2 oz)  Body mass index is 18.47 kg/m².     Physical Exam    On physical exam she is alert and  oriented x3.  I am seeing her around 13 30.  Cranial nerves 2-12 are intact.  She has no accessory muscle usage she is on 4 L nasal cannula oxygen she is clear to auscultation but decreased breath sounds bilaterally she has regular rate and rhythm no rubs gallops or murmurs mildly tachycardic.           Significant Labs: All pertinent labs within the past 24 hours have been reviewed.  Recent Lab Results         07/21/24 2121        Lactic Acid Level 1.5               Significant Imaging: I have reviewed all pertinent imaging results/findings within the past 24 hours.  Assessment/Plan:     * COPD with acute exacerbation  Patient's COPD is with exacerbation noted by use of accessory muscles for breathing currently.  Patient is currently on COPD Pathway. Continue scheduled inhalers Steroids, Antibiotics, and Supplemental oxygen and monitor respiratory status closely.     Hypercholesterolemia  Continue to monitor    Hypertension  Continue blood pressure medications Chronic, controlled. Latest blood pressure and vitals reviewed-     Temp:  [97.5 °F (36.4 °C)-98.3 °F (36.8 °C)]   Pulse:  []   Resp:  [18-22]   BP: (122-168)/(62-87)   SpO2:  [90 %-98 %] .   Home meds for hypertension were reviewed and noted below.   Hypertension Medications               valsartan (DIOVAN) 160 MG tablet Take 160 mg by mouth once daily.            While in the hospital, will manage blood pressure as follows; Continue home antihypertensive regimen    Will utilize p.r.n. blood pressure medication only if patient's blood pressure greater than 160/100 and she develops symptoms such as worsening chest pain or shortness of breath.    Hereditary sensorimotor neuropathy  Continue supportive care.      Asthma    Breathing treatments, supplemental oxygen.  Will look into more create a breathing treatment strategies at home.  Perhaps less frequent to avoid tachycardia which makes her short of breath.      VTE Risk Mitigation (From admission,  onward)           Ordered     IP VTE HIGH RISK PATIENT  Once         07/22/24 0031     Place sequential compression device  Until discontinued         07/22/24 0031                               Inpatient Nutrition Evaluation    Admit Date: 7/21/2024   Total duration of encounter: 1 day    Nutrition Recommendation/Prescription     Continue Low Sodium,  2 gm Diet as tolerated.   Monitor lytes and replete as needed.       RD following and available as needed. Thank you.     Nutrition Assessment     Chart Review    Reason Seen: continuous nutrition monitoring    Malnutrition Screening Tool Results   Have you recently lost weight without trying?: No  Have you been eating poorly because of a decreased appetite?: No   MST Score: 0     Diagnosis:  COPD with acute exacerbation.     Relevant Medical History:   Anxiety disorder, unspecified      CMTS (Charcot-Toya-Tooth syndrome)      Hypertension      Muscular dystrophy      Smoker          Nutrition-Related Medications:   Methylprednisolone.     Nutrition-Related Labs:  7/22: No new labs.   7/21: K+ 3.1; ; Ca+ 10.5.    Diet Order: Diet Low Sodium, 2gm  Oral Supplement Order: none  Appetite/Oral Intake: fair/25-50% of meals  Factors Affecting Nutritional Intake: none identified  Food/Rastafari/Cultural Preferences: none reported  Food Allergies: none reported       Wound(s):       Comments    7/22: Pt with fair appetite today. No recent weight loss noted/reported. No chewing/swallowing issues reported. Labs and meds reviewed. Will continue to monitor during stay.     Anthropometrics         Last Weight: 42.9 kg (94 lb 9.2 oz) (07/21/24 9905)       BMI Classification: not applicable No height recorded.                                          Usual Weight Provided By: EMR weight history    Wt Readings from Last 5 Encounters:   07/21/24 42.9 kg (94 lb 9.2 oz)   04/15/24 40.8 kg (90 lb)   03/12/23 37.2 kg (82 lb)   09/15/22 37.6 kg (83 lb)   05/04/22 37.2 kg (82 lb)      Weight Change(s) Since Admission:  Admit Weight: 42.9 kg (94 lb 9.2 oz) (07/21/24 3062)      Patient Education    Not applicable.    Monitoring & Evaluation     Dietitian will monitor food and beverage intake, weight, weight change, electrolyte/renal panel, glucose/endocrine profile, and gastrointestinal profile.  Nutrition Risk/Follow-Up: low (follow-up in 5-7 days)  Patients assigned 'low nutrition risk' status do not qualify for a full nutritional assessment but will be monitored and re-evaluated in a 5-7 day time period. Please consult if re-evaluation needed sooner.    Panchito De La Torre III, MD  Department of Hospital Medicine  [facility]

## 2024-07-23 NOTE — SUBJECTIVE & OBJECTIVE
Past Medical History:   Diagnosis Date    Anxiety disorder, unspecified     CMTS (Charcot-Toya-Tooth syndrome)     COPD (chronic obstructive pulmonary disease)     Hypertension     Muscular dystrophy     Smoker        Past Surgical History:   Procedure Laterality Date    CHOLECYSTECTOMY      HIP REPLACEMENT ARTHROPLASTY      HYSTERECTOMY         Review of patient's allergies indicates:   Allergen Reactions    Aspirin      Other reaction(s): Unable to tolerate  325 mg dose    Codeine      Other reaction(s): Irregular Heart Rate, unknown    Ezetimibe-simvastatin     Pravastatin      Other reaction(s): unknown       No current facility-administered medications on file prior to encounter.     Current Outpatient Medications on File Prior to Encounter   Medication Sig    acetaminophen (TYLENOL) 500 mg Cap Take 1,000 mg by mouth nightly.    aspirin (ECOTRIN) 81 MG EC tablet Take 81 mg by mouth every evening.    bimal/D3/mag11/zinc//julio/bor (CALTRATE 600-D PLUS MINERALS ORAL) Take 1 tablet by mouth 2 (two) times a day.    dorzolamide-timolol 2-0.5% (COSOPT) 22.3-6.8 mg/mL ophthalmic solution Place 1 drop into both eyes 2 (two) times daily.    gabapentin (NEURONTIN) 300 MG capsule Take 300 mg by mouth 4 (four) times daily.    omeprazole (PRILOSEC) 20 MG capsule Take 20 mg by mouth once daily.    valsartan (DIOVAN) 160 MG tablet Take 160 mg by mouth once daily.    [DISCONTINUED] diphenhydrAMINE (SOMINEX) 25 mg tablet Take 25 mg by mouth nightly.    albuterol-ipratropium (DUO-NEB) 2.5 mg-0.5 mg/3 mL nebulizer solution Take 3 mLs by nebulization every 6 (six) hours as needed for Wheezing. Rescue     Family History       Problem Relation (Age of Onset)    Coronary artery disease Mother    Hypertension Mother          Tobacco Use    Smoking status: Heavy Smoker     Current packs/day: 0.50     Types: Cigarettes    Smokeless tobacco: Never   Substance and Sexual Activity    Alcohol use: Not Currently    Drug use: Never    Sexual  activity: Not on file     Review of Systems   Constitutional:  Positive for activity change. Negative for fever.   HENT:  Negative for congestion.    Respiratory:  Positive for cough and shortness of breath.      Objective:     Vital Signs (Most Recent):  Temp: 97.5 °F (36.4 °C) (07/22/24 1915)  Pulse: 96 (07/22/24 1915)  Resp: 20 (07/22/24 1908)  BP: (!) 146/81 (07/22/24 1915)  SpO2: 98 % (07/22/24 1915) Vital Signs (24h Range):  Temp:  [97.5 °F (36.4 °C)-98.3 °F (36.8 °C)] 97.5 °F (36.4 °C)  Pulse:  [] 96  Resp:  [18-22] 20  SpO2:  [90 %-98 %] 98 %  BP: (122-168)/(62-87) 146/81     Weight: 42.9 kg (94 lb 9.2 oz)  Body mass index is 18.47 kg/m².     Physical Exam    On physical exam she is alert and oriented x3.  I am seeing her around 13 30.  Cranial nerves 2-12 are intact.  She has no accessory muscle usage she is on 4 L nasal cannula oxygen she is clear to auscultation but decreased breath sounds bilaterally she has regular rate and rhythm no rubs gallops or murmurs mildly tachycardic.           Significant Labs: All pertinent labs within the past 24 hours have been reviewed.  Recent Lab Results         07/21/24 2121        Lactic Acid Level 1.5               Significant Imaging: I have reviewed all pertinent imaging results/findings within the past 24 hours.

## 2024-07-23 NOTE — PLAN OF CARE
Problem: Adult Inpatient Plan of Care  Goal: Plan of Care Review  Outcome: Progressing  Goal: Patient-Specific Goal (Individualized)  Outcome: Progressing  Goal: Absence of Hospital-Acquired Illness or Injury  Outcome: Progressing  Goal: Optimal Comfort and Wellbeing  Outcome: Progressing  Goal: Readiness for Transition of Care  Outcome: Progressing     Problem: Hypertension Acute  Goal: Blood Pressure Within Desired Range  Outcome: Progressing     Problem: COPD (Chronic Obstructive Pulmonary Disease)  Goal: Optimal Chronic Illness Coping  Outcome: Progressing  Goal: Optimal Level of Functional [place]  Outcome: Progressing  Goal: Absence of Infection Signs and Symptoms  Outcome: Progressing  Goal: Improved Oral Intake  Outcome: Progressing  Goal: Effective Oxygenation and Ventilation  Outcome: Progressing     Problem: Anxiety  Goal: Anxiety Reduction or Resolution  Outcome: Progressing     Problem: Gas Exchange Impaired  Goal: Optimal Gas Exchange  Outcome: Progressing

## 2024-07-23 NOTE — ASSESSMENT & PLAN NOTE
Breathing treatments, supplemental oxygen.  Will look into more create a breathing treatment strategies at home.  Perhaps less frequent to avoid tachycardia which makes her short of breath.

## 2024-07-23 NOTE — ASSESSMENT & PLAN NOTE
Patient's COPD is with exacerbation noted by use of accessory muscles for breathing currently.  Patient is currently on COPD Pathway. Continue scheduled inhalers Steroids, Antibiotics, and Supplemental oxygen and monitor respiratory status closely.

## 2024-07-24 VITALS
RESPIRATION RATE: 20 BRPM | DIASTOLIC BLOOD PRESSURE: 74 MMHG | OXYGEN SATURATION: 97 % | BODY MASS INDEX: 18.56 KG/M2 | TEMPERATURE: 98 F | HEART RATE: 73 BPM | HEIGHT: 60 IN | WEIGHT: 94.56 LBS | SYSTOLIC BLOOD PRESSURE: 167 MMHG

## 2024-07-24 PROCEDURE — 27000221 HC OXYGEN, UP TO 24 HOURS

## 2024-07-24 PROCEDURE — 25000003 PHARM REV CODE 250: Performed by: INTERNAL MEDICINE

## 2024-07-24 PROCEDURE — 97116 GAIT TRAINING THERAPY: CPT

## 2024-07-24 PROCEDURE — 94640 AIRWAY INHALATION TREATMENT: CPT

## 2024-07-24 PROCEDURE — 94761 N-INVAS EAR/PLS OXIMETRY MLT: CPT

## 2024-07-24 PROCEDURE — A4216 STERILE WATER/SALINE, 10 ML: HCPCS | Performed by: INTERNAL MEDICINE

## 2024-07-24 PROCEDURE — 25000242 PHARM REV CODE 250 ALT 637 W/ HCPCS: Performed by: INTERNAL MEDICINE

## 2024-07-24 PROCEDURE — 97162 PT EVAL MOD COMPLEX 30 MIN: CPT

## 2024-07-24 PROCEDURE — 99900035 HC TECH TIME PER 15 MIN (STAT)

## 2024-07-24 PROCEDURE — 63600175 PHARM REV CODE 636 W HCPCS: Performed by: INTERNAL MEDICINE

## 2024-07-24 RX ORDER — NICOTINE 7MG/24HR
1 PATCH, TRANSDERMAL 24 HOURS TRANSDERMAL DAILY
Qty: 14 PATCH | Refills: 0 | Status: SHIPPED | OUTPATIENT
Start: 2024-07-24 | End: 2024-08-07

## 2024-07-24 RX ORDER — AZITHROMYCIN 500 MG/1
500 TABLET, FILM COATED ORAL DAILY
Qty: 3 TABLET | Refills: 0 | Status: SHIPPED | OUTPATIENT
Start: 2024-07-25 | End: 2024-07-28

## 2024-07-24 RX ORDER — PREDNISONE 20 MG/1
TABLET ORAL
Qty: 12 TABLET | Refills: 0 | Status: SHIPPED | OUTPATIENT
Start: 2024-07-24 | End: 2024-08-01

## 2024-07-24 RX ADMIN — PANTOPRAZOLE SODIUM 40 MG: 40 TABLET, DELAYED RELEASE ORAL at 09:07

## 2024-07-24 RX ADMIN — BUDESONIDE 0.5 MG: 0.5 INHALANT RESPIRATORY (INHALATION) at 07:07

## 2024-07-24 RX ADMIN — VALSARTAN 160 MG: 80 TABLET, FILM COATED ORAL at 09:07

## 2024-07-24 RX ADMIN — SODIUM CHLORIDE, PRESERVATIVE FREE 10 ML: 5 INJECTION INTRAVENOUS at 06:07

## 2024-07-24 RX ADMIN — GABAPENTIN 300 MG: 300 CAPSULE ORAL at 09:07

## 2024-07-24 RX ADMIN — IPRATROPIUM BROMIDE AND ALBUTEROL SULFATE 3 ML: .5; 3 SOLUTION RESPIRATORY (INHALATION) at 07:07

## 2024-07-24 RX ADMIN — METHYLPREDNISOLONE SODIUM SUCCINATE 60 MG: 40 INJECTION, POWDER, FOR SOLUTION INTRAMUSCULAR; INTRAVENOUS at 09:07

## 2024-07-24 RX ADMIN — DORZOLAMIDE HYDROCHLORIDE AND TIMOLOL MALEATE 1 DROP: 20; 5 SOLUTION OPHTHALMIC at 09:07

## 2024-07-24 NOTE — PLAN OF CARE
7/24/24 [facility] arranged for d/c home.  Requested by pt and daughter-used before.

## 2024-07-24 NOTE — HOSPITAL COURSE
Patient was hospital course was consistent with rubs recovery from COPD exacerbation.  Respiratory PCRs were negative.  Patient was today just started coughing up some grayish yellow phlegm.  Consistent with COPD exacerbation she is finally coughing up things once I think she has gotten enough expiratory forced clear.  Her presentation was worked up in terms of a CT PE protocol that was negative.  Chest x-rays did not show anything very much as well.  Mildly concerned this is her shortness breath was rather insidious but quick.  I got an echocardiogram that showed a normal EF and normal valves, no obvious pathology.  I would like to get a Holter monitor on her as an outpatient which may explain why she was having some periods of weakness and shortness of breaths perhaps on top of all this or contributing to it.    I will send her home after these 3 days of IV steroids and antibiotics on a steroid taper as well as an azithromycin tri Abdias.      She is to follow up in my office in 1-2 weeks and call my office if she has any issues in the next couple of days.  She understands.  Otherwise no new medical changes

## 2024-07-24 NOTE — PLAN OF CARE
Problem: Adult Inpatient Plan of Care  Goal: Plan of Care Review  Outcome: Progressing  Goal: Patient-Specific Goal (Individualized)  Outcome: Progressing  Goal: Absence of Hospital-Acquired Illness or Injury  Outcome: Progressing  Goal: Optimal Comfort and Wellbeing  Outcome: Progressing  Goal: Readiness for Transition of Care  Outcome: Progressing     Problem: Hypertension Acute  Goal: Blood Pressure Within Desired Range  Outcome: Progressing     Problem: COPD (Chronic Obstructive Pulmonary Disease)  Goal: Optimal Chronic Illness Coping  Outcome: Progressing  Goal: Optimal Level of Functional [place]  Outcome: Progressing  Goal: Absence of Infection Signs and Symptoms  Outcome: Progressing  Goal: Improved Oral Intake  Outcome: Progressing  Goal: Effective Oxygenation and Ventilation  Outcome: Progressing     Problem: Anxiety  Goal: Anxiety Reduction or Resolution  Outcome: Progressing     Problem: Gas Exchange Impaired  Goal: Optimal Gas Exchange  Outcome: Progressing

## 2024-07-24 NOTE — DISCHARGE SUMMARY
[facility]  Hospital Medicine  Discharge Summary      Patient Name: Betty Crochet Lejeune  MRN: [id number]  [facility]: [id number]  Patient Class: IP- Inpatient  Admission Date: 7/21/2024  Hospital Length of Stay: 2 days  Discharge Date and Time:  07/24/2024 10:27 AM  Attending Physician: Panchito De La Torre III, *   Discharging Provider: Panchito De La Torre III, MD  Primary Care Provider: Panchito De La Torre III, MD    Primary Care Team: Networked reference to record PCT     HPI:   Patient was a 79-year-old white female with muscular dystrophy who presents with COPD exacerbation in terms of shortness of breaths.  She was a former smoker for many years.  She stated she just could not take it anymore.  She states that she does not take her inhalers at home now sometimes because they make her feel jittery and short of breath.  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      She denies any productive cough fevers chills nausea or signs of toxicity or infection.  We are admitting her to the hospital for a COPD exacerbation treatment.    * No surgery found *      Hospital Course:   Patient was hospital course was consistent with rubs recovery from COPD exacerbation.  Respiratory PCRs were negative.  Patient was today just started coughing up some grayish yellow phlegm.  Consistent with COPD exacerbation she is finally coughing up things once I think she has gotten enough expiratory forced clear.  Her presentation was worked up in terms of a CT PE protocol that was negative.  Chest x-rays did not show anything very much as well.  Mildly concerned this is her shortness breath was rather insidious but quick.  I got an echocardiogram that showed a normal EF and normal valves, no obvious pathology.  I would like to get a Holter monitor on her as an outpatient which may explain why she was having some periods of weakness and shortness of breaths perhaps on top of all this  or contributing to it.    I will send her home after these 3 days of IV steroids and antibiotics on a steroid taper as well as an azithromycin tri Abdias.      She is to follow up in my office in 1-2 weeks and call my office if she has any issues in the next couple of days.  She understands.  Otherwise no new medical changes     Goals of Care Treatment Preferences:  Code Status: Full Code      Consults:     No new Assessment & Plan notes have been filed under this hospital service since the last note was generated.  Service: Hospital Medicine    Final Active Diagnoses:    Diagnosis Date Noted POA    PRINCIPAL PROBLEM:  COPD with acute exacerbation [J44.1] 07/21/2024 Yes    Tobacco dependency [F17.200] 07/23/2024 Unknown    Hypertension [I10] 07/21/2024 Yes    Hereditary sensorimotor neuropathy [G60.0] 1945 Yes      Problems Resolved During this Admission:       Discharged Condition: fair    Disposition: Home or Self Care    Follow Up:   Follow-up Information       Panchito De La Torre III, MD Follow up in 2 week(s).    Specialty: Internal Medicine  Contact information:  [address]  [address]  [place] [address] [phone]                           Patient Instructions:      Reason for not Ordering Smoking Cessation Referral     Order Specific Question Answer Comments   Reason for not ordering: Not medically appropriate at this time        Significant Diagnostic Studies: Radiology: CT scan:  CT was with out PE.  Echocardiogram normal.  Normal EF.    Pending Diagnostic Studies:       None           Medications:  Transfer Medications (for Discharge Readmit only):   Current Facility-Administered Medications   Medication Dose Route Frequency Provider Last Rate Last Admin    acetaminophen tablet 325 mg  325 mg Oral Q6H PRN Panchito De La Torre III, MD        acetaminophen tablet 650 mg  650 mg Oral Q8H PRN Panchito De La Torre III, MD   650 mg at 07/23/24 2014    albuterol-ipratropium 2.5 mg-0.5 mg/3 mL nebulizer  solution 3 mL  3 mL Nebulization Q6H WAKE Panchito De La Torre III, MD   3 mL at 07/24/24 0706    albuterol-ipratropium 2.5 mg-0.5 mg/3 mL nebulizer solution 3 mL  3 mL Nebulization Q6H PRN Panchito De La Torre III, MD        budesonide nebulizer solution 0.5 mg  0.5 mg Nebulization Q12H Panchito De La Torre III, MD   0.5 mg at 07/24/24 0707    dorzolamide-timolol 2-0.5% ophthalmic solution 1 drop  1 drop Both Eyes BID Panchito De La Torre III, MD   1 drop at 07/24/24 0913    enoxaparin injection 40 mg  40 mg Subcutaneous Daily Panchito De La Torre III, MD   40 mg at 07/23/24 2358    gabapentin capsule 300 mg  300 mg Oral TID Panchito De La Torre III, MD   300 mg at 07/24/24 0909    methylPREDNISolone sodium succinate injection 60 mg  60 mg Intravenous Q12H Panchito De La Torre III, MD   60 mg at 07/24/24 0915    nicotine 14 mg/24 hr 1 patch  1 patch Transdermal Daily Panchito De La Torre III, MD        pantoprazole EC tablet 40 mg  40 mg Oral Daily Panchito De La Torre III, MD   40 mg at 07/24/24 0909    sodium chloride 0.9% flush 10 mL  10 mL Intravenous Q8H Panchito De La Torre III, MD   10 mL at 07/24/24 0600    valsartan tablet 160 mg  160 mg Oral Daily Panchito De La Torre III, MD   160 mg at 07/24/24 0909       Indwelling Lines/Drains at time of discharge:   Lines/Drains/Airways       None                   Time spent on the discharge of patient: 30 minutes    I spoke to the daughter Alissa explained the discharge summary in had adequate time stay answer their questions.  This was done by cell phone         Panchito De La Torre III, MD  Department of Hospital Medicine  [facility]

## 2024-07-24 NOTE — PROGRESS NOTES
[facility]  [address] [address]  Phone: [phone]    (Hospital) Internal Medicine  Progress Note      PATIENT NAME: Betty Crochet Lejeune  MRN: [id number]  TODAY'S DATE: 07/23/2024  ADMIT DATE: 7/21/2024    SUBJECTIVE     PRINCIPLE PROBLEM: COPD with acute exacerbation    INTERVAL HISTORY:    7/23/2024  Patient was stated she felt better today.  She just could not understand why she got so short of breath so quickly prior to admission.  Chest x-ray CTs were unrevealing in her workup.  Upper respiratory PCR also negative.  She was responding well to the steroids.        Review of patient's allergies indicates:   Allergen Reactions    Aspirin      Other reaction(s): Unable to tolerate  325 mg dose    Codeine      Other reaction(s): Irregular Heart Rate, unknown    Ezetimibe-simvastatin     Pravastatin      Other reaction(s): unknown       ROS    Bilateral lower extremity mild edema.  Otherwise -14 point review of systems      OBJECTIVE     VITAL SIGNS (Most Recent)  Temp: 97.3 °F (36.3 °C) (07/23/24 2005)  Pulse: 99 (07/23/24 2005)  Resp: 18 (07/23/24 2005)  BP: (!) 157/73 (07/23/24 2005)  SpO2: 96 % (07/23/24 2005)    VENTILATION STATUS  Resp: 18 (07/23/24 2005)  SpO2: 96 % (07/23/24 2005)           I & O (Last 24H):  Intake/Output Summary (Last 24 hours) at 7/23/2024 2318  Last data filed at 7/23/2024 1622  Gross per 24 hour   Intake 540 ml   Output 975 ml   Net -435 ml       WEIGHTS  Wt Readings from Last 3 Encounters:   07/21/24 2315 42.9 kg (94 lb 9.2 oz)   04/15/24 2247 40.8 kg (90 lb)   03/12/23 1617 37.2 kg (82 lb)       Physical Exam    Patient was alert and oriented x3.  No respiratory distress on 4 L nasal cannula oxygen.  No JVD.  Cranial nerves 2-12 are intact.  Decreased breath sounds bilaterally but no coarse sounds no wheezing.  Abdomen is soft nontender nondistended regular rate and rhythm no rubs gallops or murmurs.  +1 bilateral lower extremity  "edema.    SCHEDULED MEDS:   albuterol-ipratropium  3 mL Nebulization Q6H WAKE    budesonide  0.5 mg Nebulization Q12H    cefTRIAXone (Rocephin) IV (PEDS and ADULTS)  1 g Intravenous Q24H    dorzolamide-timolol 2-0.5%  1 drop Both Eyes BID    gabapentin  300 mg Oral TID    methylPREDNISolone sodium succinate  60 mg Intravenous Q12H    nicotine  1 patch Transdermal Daily    pantoprazole  40 mg Oral Daily    sodium chloride 0.9%  10 mL Intravenous Q8H    valsartan  160 mg Oral Daily       CONTINUOUS INFUSIONS:    PRN MEDS:  Current Facility-Administered Medications:     acetaminophen, 325 mg, Oral, Q6H PRN    acetaminophen, 650 mg, Oral, Q8H PRN    albuterol-ipratropium, 3 mL, Nebulization, Q6H PRN    LABS AND DIAGNOSTICS     CBC LAST 3 DAYS  Recent Labs   Lab 07/21/24 2107 07/23/24  0811   WBC 10.70 11.69*   RBC 4.33 3.92*   HGB 12.2 11.2*   HCT 39.5 34.5*   MCV 91.2 88.0   MCH 28.2 28.6   MCHC 30.9* 32.5*   RDW 12.9 13.3    234   MPV 9.7 10.1       COAGULATION LAST 3 DAYS  No results for input(s): "LABPT", "INR", "APTT" in the last 168 hours.    CHEMISTRY LAST 3 DAYS  Recent Labs   Lab 07/21/24 2107 07/23/24  0811    138   K 3.1* 3.9   CL 96* 100   CO2 29 28   BUN 16.0 19.0   CREATININE 0.50* 0.50*   CALCIUM 10.5* 9.7   MG  --  2.10   ALBUMIN 4.2 3.7   ALKPHOS 95 75   ALT 18 16   AST 21 17   BILITOT 0.7 0.3       CARDIAC PROFILE LAST 3 DAYS  Recent Labs   Lab 07/21/24 2107   .6*   TROPONINI 0.017       ENDOCRINE LAST 3 DAYS  No results for input(s): "TSH", "PROCAL" in the last 168 hours.    LAST ARTERIAL BLOOD GAS  ABG  No results for input(s): "PH", "PO2", "PCO2", "HCO3", "BE" in the last 168 hours.    LAST 7 DAYS MICROBIOLOGY   Microbiology Results (last 7 days)       Procedure Component Value Units Date/Time    Blood culture #1 **CANNOT BE ORDERED STAT** [[id number]]  (Normal) Collected: 07/21/24 2121    Order Status: Completed Specimen: Blood from Forearm, Right Updated: 07/23/24 0800     " Blood Culture No Growth At 24 Hours    Blood culture #2 **CANNOT BE ORDERED STAT** [[id number]]  (Normal) Collected: 07/21/24 2117    Order Status: Completed Specimen: Blood from Antecubital, Right Updated: 07/23/24 0800     Blood Culture No Growth At 24 Hours            MOST RECENT IMAGING  Echo    Left Ventricle: There is normal systolic function with a visually   estimated ejection fraction of 65 - 70%.    Aortic Valve: The aortic valve is a trileaflet valve. There is mild   aortic valve sclerosis.    Mitral Valve: There is mild regurgitation.    Tricuspid Valve: There is mild regurgitation.    Pulmonic Valve: There is mild regurgitation.      [facility]  Results for orders placed during the hospital encounter of 07/21/24    Echo    Interpretation Summary    Left Ventricle: There is normal systolic function with a visually estimated ejection fraction of 65 - 70%.    Aortic Valve: The aortic valve is a trileaflet valve. There is mild aortic valve sclerosis.    Mitral Valve: There is mild regurgitation.    Tricuspid Valve: There is mild regurgitation.    Pulmonic Valve: There is mild regurgitation.      CURRENT/PREVIOUS VISIT EKG  Results for orders placed or performed during the hospital encounter of 07/21/24   EKG 12-LEAD    Collection Time: 07/21/24  9:05 PM   Result Value Ref Range    QRS Duration 110 ms    [facility] QTC Calculation 462 ms    Narrative    Test Reason : R07.9,    Vent. Rate : 118 BPM     Atrial Rate : 118 BPM     P-R Int : 128 ms          QRS Dur : 110 ms      QT Int : 330 ms       P-R-T Axes : 078 100 063 degrees     QTc Int : 462 ms    Sinus tachycardia with Premature atrial complexes  Right bundle branch block  Abnormal ECG    Confirmed by Yovany Ashley MD ([id number]) on 7/23/2024 9:24:45 PM    Referred By: KAVIN   SELF           Confirmed By:Yovany Ashley MD       ASSESSMENT/PLAN:     Active Hospital Problems    Diagnosis    *COPD with acute exacerbation    Tobacco dependency    Hypertension    Hereditary  sensorimotor neuropathy       ASSESSMENT & PLAN:   Will convert to p.o. steroids and likely plan for discharge tomorrow pending echo.  If echo is normal then we can exclude any sort of valvulopathy or EF issue which could be contributing to her shortness a breath.    Consider thromboembolic disease workup as an outpatient however she had a negative CT PE protocol.  Consider bilateral lower extremities as an outpatient.      RECOMMENDATIONS:  DVT prophylaxis Lovenox GI prophylaxis pantoprazole        Panchito De La Torre III, MD  Department of Hospital Medicine ([facility])   Date of Service: 07/23/2024  11:18 PM

## 2024-07-24 NOTE — PT/OT/SLP EVAL
Physical Therapy Evaluation    Patient Name:  Betty Crochet Lejeune   MRN:  [id number]    Recommendations:     Discharge Recommendations: Low Intensity Therapy (home health services)   Discharge Equipment Recommendations: none   Barriers to discharge: None    Assessment:     Betty Crochet Lejeune is a 79 y.o. female admitted with a medical diagnosis of COPD with acute exacerbation, with history of Charcot Toya Tooth disease, Muscular Dystrophy, and anxiety (per chart).  She presents with the following impairments/functional limitations: weakness, impaired endurance, impaired sensation, impaired functional mobility, gait instability .    Assessment and Treatment: Patient did very well overall considering her diagnosis, multiple comorbidities, and prolonged bedrest. Patient ambulated ~150ft with a RW on 3L with intermittent CGA for safety - did exhibit one loss of balance necessitating min A to correct but this occurred when patient was distracted by another medical provider and challenged them to a race (SPO2 maintained in 90s) - prior to admit patient states she ambulated with a rollator on 4L and feels the rollator was easier to maneuver. Patient has had one fall in the past six months (fell on Saturday) resulting in pain in her right knee - states her right knee does not feel as stable as it used to prior to the fall. States she has a WC at home she can utilize for mobility in her house until she feels more confident with her knee. States her home is WC accessible and her grandson checks on her multiple times a day, her family lives nearby, and she has paid caregivers to assist her with showering.  Patient is a great candidate for home health physical therapy services to improve her stability in her right leg and overall confidence with mobility in her home environment. Patient also demo'd independence with bed mobility and SBA with transfers using a RW. Notified patient's nurse of her mobility status.    Rehab  Prognosis: Good; patient would benefit from acute skilled PT services to address these deficits and reach maximum level of function.    Recent Surgery: * No surgery found *      Plan:     During this hospitalization, patient to be seen 6 x/week to address the identified rehab impairments via gait training, therapeutic activities, therapeutic exercises and progress toward the following goals:    Plan of Care Expires:       Subjective     Chief Complaint: shortness of breath  Patient/Family Comments/goals: to go home  Pain/Comfort:  Pain Rating 1: 4/10 (legs, states they were stiff from being in the bed)  Pain Addressed 1: Reposition, Nurse notified    Patients cultural, spiritual, Bahai conflicts given the current situation:      Living Environment:  Pt states her home (mobile home) is WC accessible and her grandson checks on her multiple times a day, her family lives nearby, and she has paid caregivers to assist her with showering.    Prior to admission, patients level of function was independent with basic ADLs, caregivers assisted her with showering 2 days a week, grandson assists with her appointments and needs, ambulated mod I on 4L with rollator.  Equipment used at home: wheelchair, rollator, walker, rolling, oxygen.  DME owned (not currently used): none.  Upon discharge, patient will have assistance from paid caregivers and family.    Objective:     Communicated with nurse, Gisela, prior to session.  Patient found HOB elevated with bed alarm, peripheral IV  upon PT entry to room.      General Precautions: Standard, fall  Orthopedic Precautions:N/A   Braces: N/A  Respiratory Status: Nasal cannula, flow 3 L/min  Posture: Does exhibit severe kyphosis in addition to deformities of finger alignment    Exams:  Cognitive Exam:  Patient is oriented to Person, Place, Time, and Situation  RLE ROM: WFL  RLE Strength: ~3+/5 : does have muscular dystrophy  LLE ROM: WFL  LLE Strength: ~3+/5 does have muscular  dystrophy    Functional Mobility:  Bed Mobility:     Supine to Sit: modified independence  Sit to Supine: modified independence  Transfers:     Sit to Stand:  stand by assistance with rolling walker  Gait: Patient ambulated ~150ft with a RW on 3L with intermittent CGA for safety - did exhibit one loss of balance necessitating min A to correct but this occurred when patient was distracted by another medical provider and challenged them to a race (SPO2 maintained in 90s)      AM-PAC 6 CLICK MOBILITY  Total Score:            Patient left HOB elevated with all lines intact, call button in reach, bed alarm on, and nurse notified.    GOALS:   Multidisciplinary Problems       Physical Therapy Goals          Problem: Physical Therapy    Goal Priority Disciplines Outcome Goal Variances Interventions   Physical Therapy Goal     PT, PT/OT Progressing     Description: Goals to be met by: discharge     Patient will increase functional independence with mobility by performin. Bed to chair transfer with Modified [place] using Rolling Walker  2. Gait  x 150 feet with Modified [place] using Rolling Walker.                          History:     Past Medical History:   Diagnosis Date    Anxiety disorder, unspecified     CMTS (Charcot-Toya-Tooth syndrome)     COPD (chronic obstructive pulmonary disease)     Hypertension     Muscular dystrophy     Smoker        Past Surgical History:   Procedure Laterality Date    CHOLECYSTECTOMY      HIP REPLACEMENT ARTHROPLASTY      HYSTERECTOMY         Time Tracking:     PT Received On: 24  PT Start Time: 948     PT Stop Time: 1018  PT Total Time (min): 30 min     Billable Minutes: Evaluation 20 and Gait Training 10      2024

## 2024-07-24 NOTE — PLAN OF CARE
Problem: Adult Inpatient Plan of Care  Goal: Plan of Care Review  Outcome: Progressing  Goal: Patient-Specific Goal (Individualized)  Outcome: Progressing  Goal: Absence of Hospital-Acquired Illness or Injury  Outcome: Progressing  Goal: Optimal Comfort and Wellbeing  Outcome: Progressing  Goal: Readiness for Transition of Care  Outcome: Progressing     Problem: Hypertension Acute  Goal: Blood Pressure Within Desired Range  Outcome: Progressing     Problem: COPD (Chronic Obstructive Pulmonary Disease)  Goal: Optimal Chronic Illness Coping  Outcome: Progressing  Goal: Optimal Level of Functional [place]  Outcome: Progressing  Goal: Absence of Infection Signs and Symptoms  Outcome: Progressing  Goal: Improved Oral Intake  Outcome: Progressing  Goal: Effective Oxygenation and Ventilation  Outcome: Progressing     Problem: Anxiety  Goal: Anxiety Reduction or Resolution  Outcome: Progressing     Problem: Gas Exchange Impaired  Goal: Optimal Gas Exchange  Outcome: Progressing

## 2024-07-24 NOTE — PLAN OF CARE
07/24/24 1417   Final Note   Assessment Type Final Discharge Note   Anticipated Discharge Disposition Home-Health   Hospital Resources/Appts/Education Provided Post-Acute resouces added to AVS   Post-Acute Status   Post-Acute Authorization Home Health   Home Health Status Set-up Complete/Auth obtained   Discharge Delays None known at this time     Pt was d/c'd home.  Called daughter and spoke to her about home health and she wants to use [facility], her mother has used them before in the past.  Ref sent.

## 2024-07-25 ENCOUNTER — PATIENT OUTREACH (OUTPATIENT)
Dept: ADMINISTRATIVE | Facility: CLINIC | Age: 79
End: 2024-07-25
Payer: MEDICARE

## 2024-07-25 NOTE — PROGRESS NOTES
C3 nurse spoke with Betty Crochet Lejeune  for a TCC post hospital discharge follow up call. The patient has a scheduled HOSFU appointment with Panchito De La Torre III, MD 08/07/2024 @ 2:30pm

## 2024-07-27 LAB
BACTERIA BLD CULT: NORMAL
BACTERIA BLD CULT: NORMAL

## 2024-07-30 NOTE — PHYSICIAN QUERY
"To respond, click "New Note"                                     Please provide the diagnosis or diagnoses associated with the above clinical findings:   Patient with COPD exacerbation as primary admitting diagnosis.      Additionally, patient was also with chronic respiratory failure secondary to emphysema/COPD chronic stage 4  "

## 2024-12-31 DIAGNOSIS — M81.0 SENILE OSTEOPOROSIS: Primary | ICD-10-CM

## 2025-01-07 ENCOUNTER — INFUSION (OUTPATIENT)
Dept: INFUSION THERAPY | Facility: HOSPITAL | Age: 80
End: 2025-01-07
Payer: MEDICARE

## 2025-01-07 VITALS
BODY MASS INDEX: 17.28 KG/M2 | SYSTOLIC BLOOD PRESSURE: 170 MMHG | DIASTOLIC BLOOD PRESSURE: 74 MMHG | TEMPERATURE: 98 F | WEIGHT: 88 LBS | HEIGHT: 60 IN | HEART RATE: 94 BPM | OXYGEN SATURATION: 98 % | RESPIRATION RATE: 16 BRPM

## 2025-01-07 DIAGNOSIS — M81.0 SENILE OSTEOPOROSIS: Primary | ICD-10-CM

## 2025-01-07 PROCEDURE — 96372 THER/PROPH/DIAG INJ SC/IM: CPT

## 2025-01-07 PROCEDURE — 63600175 PHARM REV CODE 636 W HCPCS: Mod: JZ,TB | Performed by: INTERNAL MEDICINE

## 2025-01-07 RX ADMIN — DENOSUMAB 60 MG: 60 INJECTION SUBCUTANEOUS at 01:01

## 2025-06-24 DIAGNOSIS — Z87.891 PERSONAL HISTORY OF TOBACCO USE, PRESENTING HAZARDS TO HEALTH: Primary | ICD-10-CM

## 2025-07-11 ENCOUNTER — INFUSION (OUTPATIENT)
Facility: HOSPITAL | Age: 80
End: 2025-07-11
Payer: MEDICARE

## 2025-07-11 VITALS
WEIGHT: 89 LBS | OXYGEN SATURATION: 95 % | HEART RATE: 90 BPM | DIASTOLIC BLOOD PRESSURE: 69 MMHG | BODY MASS INDEX: 16.8 KG/M2 | RESPIRATION RATE: 18 BRPM | TEMPERATURE: 98 F | SYSTOLIC BLOOD PRESSURE: 138 MMHG | HEIGHT: 61 IN

## 2025-07-11 DIAGNOSIS — M81.0 SENILE OSTEOPOROSIS: Primary | ICD-10-CM

## 2025-07-11 PROCEDURE — 63600175 PHARM REV CODE 636 W HCPCS: Mod: JZ,TB | Performed by: INTERNAL MEDICINE

## 2025-07-11 PROCEDURE — 96372 THER/PROPH/DIAG INJ SC/IM: CPT

## 2025-07-11 RX ADMIN — DENOSUMAB 60 MG: 60 INJECTION SUBCUTANEOUS at 01:07

## 2025-07-23 ENCOUNTER — HOSPITAL ENCOUNTER (OUTPATIENT)
Dept: RADIOLOGY | Facility: HOSPITAL | Age: 80
Discharge: HOME | End: 2025-07-23
Attending: INTERNAL MEDICINE
Payer: MEDICARE

## 2025-07-23 DIAGNOSIS — Z87.891 PERSONAL HISTORY OF TOBACCO USE, PRESENTING HAZARDS TO HEALTH: ICD-10-CM

## 2025-07-23 PROCEDURE — 71271 CT THORAX LUNG CANCER SCR C-: CPT | Mod: TC
